# Patient Record
Sex: MALE | Race: WHITE | Employment: FULL TIME | RURAL
[De-identification: names, ages, dates, MRNs, and addresses within clinical notes are randomized per-mention and may not be internally consistent; named-entity substitution may affect disease eponyms.]

---

## 2020-03-03 ENCOUNTER — OFFICE VISIT (OUTPATIENT)
Dept: FAMILY MEDICINE CLINIC | Age: 65
End: 2020-03-03

## 2020-03-03 VITALS
TEMPERATURE: 97.6 F | HEART RATE: 106 BPM | RESPIRATION RATE: 16 BRPM | HEIGHT: 67 IN | OXYGEN SATURATION: 99 % | WEIGHT: 173 LBS | SYSTOLIC BLOOD PRESSURE: 131 MMHG | BODY MASS INDEX: 27.15 KG/M2 | DIASTOLIC BLOOD PRESSURE: 76 MMHG

## 2020-03-03 DIAGNOSIS — M79.89 LEG SWELLING: Primary | ICD-10-CM

## 2020-03-03 RX ORDER — AMOXICILLIN AND CLAVULANATE POTASSIUM 875; 125 MG/1; MG/1
1 TABLET, FILM COATED ORAL EVERY 12 HOURS
Qty: 20 TAB | Refills: 0 | Status: SHIPPED | OUTPATIENT
Start: 2020-03-03 | End: 2020-03-13

## 2020-03-03 NOTE — LETTER
NOTIFICATION RETURN TO WORK / SCHOOL 
 
3/3/2020 2:50 PM 
 
Mr. Delvin Collet [address] [facility] [address] To Whom It May Concern: 
 
Delvin Collet is currently under the care of Charles Montejo. Please excuse him from any days he missed from 3/1/20-3/7/20. He will return to work/school on: 3/8/20 If there are questions or concerns please have the patient contact our office. Sincerely, Kerrin Bence, MD

## 2020-03-03 NOTE — PATIENT INSTRUCTIONS
March 3, 2020 Coryaleida Mariscalyovana [email or web address] [facility] [address] Dear Carlo Lockwood: Thank you for requesting access to [employer]. Please follow the instructions below to view your test results, access and download parts of your medical record, view details of your past and upcoming appointments, and view your medications online. How Do I Sign Up? 1. In your internet browser, go to [email or web address] 2. Click on the First Time User? Click Here link in the Sign In box. You will see the New Member Sign Up page. 3. Enter your "[employer]t Access Code exactly as it appears below. You will not need to use this code after youve completed the sign-up process. If you do not sign up before the expiration date, you must request a new code. · [employer] Access Code: [facility] · Expires: 4/17/2020  7:47 AM 
 
4. Enter the last four digits of your Social Security Number (xxxx) and Date of Birth (mm/dd/yyyy) as indicated and click Submit. You will be taken to the next sign-up page. 5. Create a [employer] ID. This will be your [employer] login ID and cannot be changed, so think of one that is secure and easy to remember. 6. Create a [employer] password. You can change your password at any time. 7. Enter your Password Reset Question and Answer. This can be used at a later time if you forget your password. 8. Enter your e-mail address. You will receive e-mail notification when new information is available in [address] 9. Click Sign Up. You can now view and download portions of your medical record. 10. Click the Download Summary menu link to download a portable copy of your medical information. Additional Information: If you require any assistance or have any questions, please contact our [address] at [phone], email at [email or web address] or check online in our Frequently Asked Questions. Remember, MyChart is NOT to be used for urgent needs. For medical emergencies, dial 911. Now available from your iPhone and Android! Sincerely, Lori Mixon  
 
 
new to my practice Cellulitis: Care Instructions Your Care Instructions Cellulitis is a skin infection caused by bacteria, most often strep or staph. It often occurs after a break in the skin from a scrape, cut, bite, or puncture, or after a rash. Cellulitis may be treated without doing tests to find out what caused it. But your doctor may do tests, if needed, to look for a specific bacteria, like methicillin-resistant Staphylococcus aureus (MRSA). The doctor has checked you carefully, but problems can develop later. If you notice any problems or new symptoms, get medical treatment right away. Follow-up care is a key part of your treatment and safety. Be sure to make and go to all appointments, and call your doctor if you are having problems. It's also a good idea to know your test results and keep a list of the medicines you take. How can you care for yourself at home? · Take your antibiotics as directed. Do not stop taking them just because you feel better. You need to take the full course of antibiotics. · Prop up the infected area on pillows to reduce pain and swelling. Try to keep the area above the level of your heart as often as you can. · If your doctor told you how to care for your wound, follow your doctor's instructions. If you did not get instructions, follow this general advice: 
? Wash the wound with clean water 2 times a day. Don't use hydrogen peroxide or alcohol, which can slow healing. ? You may cover the wound with a thin layer of petroleum jelly, such as Vaseline, and a nonstick bandage. ? Apply more petroleum jelly and replace the bandage as needed. · Be safe with medicines. Take pain medicines exactly as directed. ? If the doctor gave you a prescription medicine for pain, take it as prescribed. ? If you are not taking a prescription pain medicine, ask your doctor if you can take an over-the-counter medicine. To prevent cellulitis in the future · Try to prevent cuts, scrapes, or other injuries to your skin. Cellulitis most often occurs where there is a break in the skin. · If you get a scrape, cut, mild burn, or bite, wash the wound with clean water as soon as you can to help avoid infection. Don't use hydrogen peroxide or alcohol, which can slow healing. · If you have swelling in your legs (edema), support stockings and good skin care may help prevent leg sores and cellulitis. · Take care of your feet, especially if you have diabetes or other conditions that increase the risk of infection. Wear shoes and socks. Do not go barefoot. If you have athlete's foot or other skin problems on your feet, talk to your doctor about how to treat them. When should you call for help? Call your doctor now or seek immediate medical care if: 
  · You have signs that your infection is getting worse, such as: 
? Increased pain, swelling, warmth, or redness. ? Red streaks leading from the area. ? Pus draining from the area. ? A fever.  
  · You get a rash.  
 Watch closely for changes in your health, and be sure to contact your doctor if: 
  · You do not get better as expected. Where can you learn more? Go to http://candelaria-arjun.info/. Hilton Valdez in the search box to learn more about \"Cellulitis: Care Instructions. \" Current as of: April 1, 2019 Content Version: 12.2 © 4057-6010 [employer]. Care instructions adapted under license by [employer] (which disclaims liability or warranty for this information). If you have questions about a medical condition or this instruction, always ask your healthcare professional. [address] any warranty or liability for your use of this information.

## 2020-03-03 NOTE — PROGRESS NOTES
[facility]    Subjective:   Marcio Arias is a 60 y.o. male with no PMHx  CC: foot pain  History provided by patient     HPI:  Pt complains of R foot pain and swelling that started 4 days ago. It is located everywhere in his foot, but hurts most on the dorsal aspect. He feels like it is more swollen than the left side. He has been taking naproxen around the clock, and he rates it as 8/10 in severity. He had a similar episode 2 months ago that resolved on its own. He denies fevers, chills. He has not been sick recently. He has pain when he walks on it. SHx:  T: stopped when he was 22    A: none    D: none    Surg: tumor in nose 1971, dental procedures    FHx: obesity      PFSH:     No current outpatient medications on file prior to visit. No current facility-administered medications on file prior to visit. There is no problem list on file for this patient.       Social History     Socioeconomic History    Marital status: SINGLE     Spouse name: Not on file    Number of children: Not on file    Years of education: Not on file    Highest education level: Not on file   Occupational History    Not on file   Social Needs    Financial resource strain: Not on file    Food insecurity:     Worry: Not on file     Inability: Not on file    Transportation needs:     Medical: Not on file     Non-medical: Not on file   Tobacco Use    Smoking status: Never Smoker    Smokeless tobacco: Never Used   Substance and Sexual Activity    Alcohol use: Never     Frequency: Never    Drug use: Not on file    Sexual activity: Not on file   Lifestyle    Physical activity:     Days per week: Not on file     Minutes per session: Not on file    Stress: Not on file   Relationships    Social connections:     Talks on phone: Not on file     Gets together: Not on file     Attends Shinto service: Not on file     Active member of club or organization: Not on file     Attends meetings of clubs or organizations: Not on file     Relationship status: Not on file    Intimate partner violence:     Fear of current or ex partner: Not on file     Emotionally abused: Not on file     Physically abused: Not on file     Forced sexual activity: Not on file   Other Topics Concern    Not on file   Social History Narrative    Not on file       Review of Systems   Constitutional: Negative for chills and fever. Respiratory: Negative for cough, hemoptysis, sputum production and shortness of breath. Cardiovascular: Positive for leg swelling. Negative for chest pain and palpitations. Musculoskeletal: Positive for joint pain. Negative for back pain and falls. Skin: Positive for rash. Negative for itching. Erythema of forefoot. Objective:     Visit Vitals  /76 (BP 1 Location: Left arm, BP Patient Position: Sitting)   Pulse (!) 106   Temp 97.6 °F (36.4 °C) (Oral)   Resp 16   Ht 5' 7\" (1.702 m)   Wt 173 lb (78.5 kg)   SpO2 99%   BMI 27.10 kg/m²          Physical Exam  Constitutional:       General: He is not in acute distress. Appearance: Normal appearance. HENT:      Head: Normocephalic and atraumatic. Cardiovascular:      Rate and Rhythm: Normal rate and regular rhythm. Pulses: Normal pulses. Heart sounds: No murmur. Pulmonary:      Effort: Pulmonary effort is normal. No respiratory distress. Breath sounds: Normal breath sounds. No wheezing, rhonchi or rales. Musculoskeletal:         General: Swelling and tenderness present. Right lower leg: Edema present. Comments: Tenderness to palpation of right forefoot with 2+ pitting edema extending to right knee. No tenderness with light touch. Left side normal.    Skin:     General: Skin is warm. Findings: Erythema present. Comments: Erythema and warmth extending along dorsal aspect of mid forefoot. Cracked heels and thickened, yellow toenails noted bilaterally. Neurological:      Mental Status: He is alert. Motor: No weakness. Pertinent Labs/Studies: will order stat doppler      Assessment and orders:       ICD-10-CM ICD-9-CM    1. Leg swelling M79.89 729.81 DUPLEX LOWER EXT VENOUS RIGHT     Encounter Diagnoses   Name Primary?  Leg swelling Yes     Diagnoses and all orders for this visit:    1. Leg swelling - pt has been experiencing some swelling and erythema of right foot and lower leg. He has 2+ pitting edema on this side, but the right leg has no edema. Will need to rule out DVT urgently in addition to treating for cellulitis. Foot exhibits erythema and warmth in addition to the swelling. Pt has poor foot hygiene with extensively cracked soles. May benefit from visit with podiatry once acute infection has resolved. -     DUPLEX LOWER EXT VENOUS RIGHT; Future - pt has appointment tomorrow to have duplex  -     amoxicillin-clavulanate (AUGMENTIN) 875-125 mg per tablet; Take 1 Tab by mouth every twelve (12) hours for 10 days. Follow-up and Dispositions    · Return in about 1 week (around 3/10/2020). I have discussed the diagnosis with the patient and the intended plan as seen in the above orders. Social history, medical history, and labs were reviewed. The patient has received an after-visit summary and questions were answered concerning future plans. I have discussed medication side effects and warnings with the patient as well.     Amada Craig MD  Resident [facility]  03/05/20

## 2020-03-03 NOTE — PROGRESS NOTES
Body mass index is 27.1 kg/m².    Health Maintenance Due   Topic Date Due    Hepatitis C Screening  1955    DTaP/Tdap/Td series (1 - Tdap) 06/25/1968    Lipid Screen  06/25/1997    Shingrix Vaccine Age 50> (1 of 2) 06/25/2007    FOBT Q1Y Age 54-65  06/25/2007    Influenza Age 5 to Adult  08/01/2019

## 2020-03-04 ENCOUNTER — TELEPHONE (OUTPATIENT)
Dept: FAMILY MEDICINE CLINIC | Age: 65
End: 2020-03-04

## 2020-03-04 NOTE — TELEPHONE ENCOUNTER
Call transferred to this nurse. Patient currently at the hospital awaiting permission to leave the hospital based on results of duplex lower venous. Per radiology, duplex was negative for a DVT. Verbal given that patient can leave if he is negative for a DVT.

## 2020-03-05 NOTE — TELEPHONE ENCOUNTER
Attempted to call pt to inform him that he did not have blood clots, but his given phone number states \"the person you are trying to call is not accepting calls at this time. \"    Solomon Fox MD  Family Medicine Resident

## 2020-03-19 ENCOUNTER — TELEPHONE (OUTPATIENT)
Dept: FAMILY MEDICINE CLINIC | Age: 65
End: 2020-03-19

## 2020-03-19 NOTE — TELEPHONE ENCOUNTER
----- Message from Kwasi Krishna sent at 3/19/2020  2:45 PM EDT -----  Regarding: Dr. Vivien Mora telephone  Contact: [phone]  Caller's first and last name and relationship (if not the patient): n/a  Best contact number(s): [phone]  Whose call is being returned: unknown   Details to clarify the request: Pt was unable to answer his phone due to technical difficulties. Please leave a detailed voice message if unable to contact pt. Pt states texting would work as well.

## 2020-03-19 NOTE — TELEPHONE ENCOUNTER
Pt states that the erythema and swelling in his foot and calf are continuing to improve. He needs FMLA form filled out because he missed 5 days of work secondary to pain related to his cellulitis. His job told him that since he took that many days off, he needed to have this form filled out by a physician. Indicated that we could do this for him. He will be dropping off the paperwork.     Chucky Gardner MD  Family Medicine Resident

## 2020-03-19 NOTE — TELEPHONE ENCOUNTER
Per Dr. Aaron Dick will have his appointment with you by phone today @ 2:20 regarding having his FMLA Papers filled out. Pt will be available for your call today at the appointment time.  Thanks

## 2020-03-23 NOTE — TELEPHONE ENCOUNTER
Attempted to call patient. No answer and no way to leave a message. We can not text patient and he does not have active Mychart. The forms were faxed on 3/19/20 and a copy is at the  ready for .

## 2020-03-23 NOTE — TELEPHONE ENCOUNTER
Pt states that he was checking for the status of the paperwork being faxed off. They have to have the paperwork by the due date please. Please text or e-mail the Pt if at all possible because he cannot have the phone on him at work. He also needs a copy of what is sent to [facility].  Thanks

## 2020-04-01 ENCOUNTER — OFFICE VISIT (OUTPATIENT)
Dept: FAMILY MEDICINE CLINIC | Age: 65
End: 2020-04-01

## 2020-04-01 VITALS
OXYGEN SATURATION: 95 % | HEIGHT: 67 IN | BODY MASS INDEX: 27.78 KG/M2 | TEMPERATURE: 98.3 F | SYSTOLIC BLOOD PRESSURE: 117 MMHG | DIASTOLIC BLOOD PRESSURE: 71 MMHG | RESPIRATION RATE: 16 BRPM | WEIGHT: 177 LBS | HEART RATE: 78 BPM

## 2020-04-01 DIAGNOSIS — M79.671 RIGHT FOOT PAIN: ICD-10-CM

## 2020-04-01 DIAGNOSIS — L53.9 ERYTHEMA OF FOOT: Primary | ICD-10-CM

## 2020-04-01 DIAGNOSIS — M79.89 LEG SWELLING: ICD-10-CM

## 2020-04-01 RX ORDER — CLINDAMYCIN HYDROCHLORIDE 300 MG/1
300 CAPSULE ORAL 3 TIMES DAILY
Qty: 30 CAP | Refills: 0 | Status: SHIPPED | OUTPATIENT
Start: 2020-04-01 | End: 2020-04-11

## 2020-04-01 NOTE — PROGRESS NOTES
1. Have you been to the ER, urgent care clinic since your last visit? Hospitalized since your last visit? No    2. Have you seen or consulted any other health care providers outside of the [address] [place] [address] since your last visit? Include any pap smears or colon screening.  No  Reviewed record in preparation for visit and have necessary documentation  Pt did not bring medication to office visit for review  opportunity was given for questions  Goals that were addressed and/or need to be completed during or after this appointment include    Health Maintenance Due   Topic Date Due    Hepatitis C Screening  1955    DTaP/Tdap/Td series (1 - Tdap) 06/25/1978    Lipid Screen  06/25/1997    Shingrix Vaccine Age 50> (1 of 2) 06/25/2007    FOBT Q1Y Age 50-75  06/25/2007

## 2020-04-01 NOTE — PROGRESS NOTES
Patient: Cayetano Hair MRN: [id number]  SSN: [id number]    YOB: 1955  Age: 60 y.o. Sex: male        Subjective:     Chief Complaint   Patient presents with    Leg Swelling     right leg       HPI: he is a 60y.o. year old male new to me who presents for recurrence of RLE edema, erythema and pain. Patient reports an episode in January that mostly resolved sans treatment. He was seen in office 4 weeks ago for same. Venous doppler negative for DVT. He was started on antibiotic and reports improvement sans resolution. Patient denies F/C, HA, dizziness, SOB, CP, abdominal pain, dysuria, weakness or paresthesia. He works at [employer]. Patient sans other complaints or concerns at this time. Encounter Diagnoses   Name Primary?  Erythema of foot Yes    Right foot pain     Leg swelling        BP Readings from Last 3 Encounters:   04/01/20 117/71   03/03/20 131/76       Wt Readings from Last 3 Encounters:   04/01/20 177 lb (80.3 kg)   03/03/20 173 lb (78.5 kg)     Body mass index is 27.72 kg/m². Current and past medical information:    Current Medications after this visit[de-identified]     No current outpatient medications on file. No current facility-administered medications for this visit. There are no active problems to display for this patient. No past medical history on file.     No Known Allergies    Past Surgical History:   Procedure Laterality Date    HX HEENT         Social History     Socioeconomic History    Marital status: SINGLE     Spouse name: Not on file    Number of children: Not on file    Years of education: Not on file    Highest education level: Not on file   Tobacco Use    Smoking status: Never Smoker    Smokeless tobacco: Never Used   Substance and Sexual Activity    Alcohol use: Never     Frequency: Never         Objective:     Review of Systems:  Constitutional: Negative for fever or malaise  Derm: see HPI  HEENT: Negative for acute hearing or vision changes  Cardiovascular: Negative for dizziness, chest pain or palpitations  Respiratory: Negative for cough, wheezing or SOB  Musculoskeletal: see HPI  Neurological: Negative for headache, weakness or paresthesia       Vitals:    04/01/20 1123   BP: 117/71   Pulse: 78   Resp: 16   Temp: 98.3 °F (36.8 °C)   TempSrc: Oral   SpO2: 95%   Weight: 177 lb (80.3 kg)   Height: 5' 7\" (1.702 m)      Body mass index is 27.72 kg/m². Physical Exam:  Constitutional: well developed, well nourished, in no acute distress  Skin: edema and erythema right lower leg, warm to touch  Head: normocephalic, atraumatic  Eyes: sclera clear, EOMI, PERRL  Neck: normal range of motion  Cardiovascular:  regular rate and rhythm, 2+ pitting edema RLE edema  Respiratory: clear symmetrical effort  Extremities: RLE with full range of motion, antalgic gait  Neurology: normal strength and sensation  Psych: active, alert and oriented, affect appropriate     Xray: viewed by myself, no bone anomaly appreciated    Assessment and orders:       ICD-10-CM ICD-9-CM    1. Erythema of foot L53.9 695.9 XR FOOT RT MIN 3 V   2. Right foot pain M79.671 729.5 XR FOOT RT MIN 3 V   3. Leg swelling M79.89 729.81 XR FOOT RT MIN 3 V         Plan of care:  Diagnoses were discussed in detail with patient. Will cover with antibiotic. Will schedule phone follow up in 2 days. May need JULIAN, lab work if no improvement. Medication risks/benefits/side effects discussed with patient. All of the patient's questions were addressed and answered to apparent satisfaction. The patient understands and agrees with our plan of care. The patient knows to call back if they have questions about the plan of care or if symptoms change. The patient received an After-Visit Summary which contains VS, diagnoses, orders, allergy and medication lists. No care team member to display        No future appointments.     Signed By: Lexus Hui MD     April 1, 2020

## 2020-04-02 ENCOUNTER — TELEPHONE (OUTPATIENT)
Dept: FAMILY MEDICINE CLINIC | Age: 65
End: 2020-04-02

## 2020-04-02 NOTE — TELEPHONE ENCOUNTER
----- Message from Reanna Urban sent at 4/2/2020 11:10 AM EDT -----  Regarding: DR OSMAN / TELEPHONE  Patient return call        In regard to treatment and an update on symptoms.        Best contact number(s): [phone]                Reanna Urban

## 2020-04-02 NOTE — TELEPHONE ENCOUNTER
Attempted to contact patient earlier this morning as requested by physician. No answer.  Attempted to return call at this time, Phone is not accepting calls at this time    Dr. Deedee Phelps would like patient to have virtual visit scheduled for tomorrow Friday 4/3/2020

## 2020-04-03 ENCOUNTER — VIRTUAL VISIT (OUTPATIENT)
Dept: FAMILY MEDICINE CLINIC | Age: 65
End: 2020-04-03

## 2020-04-03 DIAGNOSIS — M79.671 RIGHT FOOT PAIN: ICD-10-CM

## 2020-04-03 DIAGNOSIS — L53.9 ERYTHEMA OF FOOT: Primary | ICD-10-CM

## 2020-04-03 DIAGNOSIS — M79.89 LEG SWELLING: ICD-10-CM

## 2020-04-03 NOTE — PROGRESS NOTES
Isamar Anders is a 60 y.o. male evaluated via computer/telephone on 20. Patient Identity confirmed by . Telephone encounter done in lieu of office visit due to extraordinary circumstances. A state of national and state emergency has been declared by President and [employer] to the [employer] pandemic. Aaron clarke LPN coordinated virtual visit    Consent:  Patient and/or health care decision maker is aware that that he may receive a bill for this telephone encounter, depending on his insurance coverage, and has provided verbal consent to proceed: Yes    Physician Location: Office  Patient Location: Home    CC: RLE erythema and edema  Information gathered from patient and/or health care decision maker. HPI: Isamar Anders is a 60 y.o. male with recurrent episodes of RLE edema, erythema and pain. Had cited improvement with prior course of antibiotic. Prior doppler negative for DVT. Today he says there has been some improvement in redness and swelling. Says leg is still painful. Discussed adding steroid. Patient wants to wait a view more days to see if it gets better. He knows to call next week if that is the case. We will schedule VV on 20. Current Outpatient Medications:     clindamycin (CLEOCIN) 300 mg capsule, Take 1 Cap by mouth three (3) times daily for 10 days. , Disp: 30 Cap, Rfl: 0     No Known Allergies     There are no active problems to display for this patient. Review of Systems:  Constitutional: Negative for fatigue, malaise  Resp: Negative for cough, wheezing or SOB  CV: see HPI, Negative for chest pain, dizziness or palpitations  GI: Negative for nausea or abdominal pain  MS: see HPI, Negative for acute myalgias or arthralgias   Neuro: Negative for HA, weakness or paresthesia      Assessment/Plan:  Details of this discussion including any medical advice provided:       ICD-10-CM ICD-9-CM    1. Erythema of foot L53.9 695.9    2.  Leg swelling M79.89 729.81    3. Right foot pain M79.671 729.5        Symptomatic therapy suggested: call prn if symptoms persist or worsen. Total Time: minutes: 11-20 minutes was spent on phone discussing above problems and plan. Patient medical history, prior OV notes, vitals flow sheet, lab results, medications, and allergies were reviewed during this encounter. For phone encounters:  I affirm this is a patient initiated episode with an established Patient who has not had a related appointment within my department in the past 7 days or scheduled within the next 24 hours.     Note: not billable if this call serves to triage the patient into an appointment for the relevant concern    MD [facility]  04/03/20

## 2020-04-14 ENCOUNTER — VIRTUAL VISIT (OUTPATIENT)
Dept: FAMILY MEDICINE CLINIC | Age: 65
End: 2020-04-14

## 2020-04-14 DIAGNOSIS — M79.89 LEG SWELLING: Primary | ICD-10-CM

## 2020-04-14 RX ORDER — FUROSEMIDE 20 MG/1
20 TABLET ORAL
Qty: 30 TAB | Refills: 0 | Status: SHIPPED | OUTPATIENT
Start: 2020-04-14 | End: 2020-05-15

## 2020-04-14 NOTE — PROGRESS NOTES
Gwendolyn Suazo is a 60 y.o. male evaluated via telephone on 20. Patient Identity confirmed by . Telephone encounter done in lieu of office visit due to extraordinary circumstances. A state of national and state emergency has been declared by President and [employer] to the [employer] pandemic. Rachael Ariza LPN coordinated virtual visit    Consent:  Patient and/or health care decision maker is aware that that he may receive a bill for this telephone encounter, depending on his insurance coverage, and has provided verbal consent to proceed: Yes    Physician Location: Office  Patient Location: Home    CC: right leg swelling  Information gathered from patient. HPI: Gwendolyn Suazo is a 60 y.o. male with recent hx of recurrent RLE edema, erythema and pain. He has completed second course of antibiotic sans significant improvement of edema. Says he experiences pain when he pivots on his ankle. He has had a venous doppler negative for DVT. Have previously discussed diuretic with patient. He is willing to try medication for edema. Patient advised to purchase scale to check weight as means of monitoring the efficacy of diuretic. Patient denies fever, dizziness, SOB, CP weakness or paresthesia. Current Outpatient Medications:     furosemide (LASIX) 20 mg tablet, Take 1 Tab by mouth every morning., Disp: 30 Tab, Rfl: 0     No Known Allergies     There are no active problems to display for this patient. Review of Systems:  Constitutional: Negative for fever, malaise  Resp: Negative for cough, wheezing or SOB  CV: Negative for chest pain, dizziness or palpitations  Neuro: Negative for HA, weakness or paresthesia      Assessment/Plan:  Details of this discussion including any medical advice provided: Patient advised as a precaution to self isolate at home, practice regular hand washing with soap and warm water and to utilize social distancing in interactions with people. ICD-10-CM ICD-9-CM    1. Leg swelling M79.89 729.81 furosemide (LASIX) 20 mg tablet       Symptomatic therapy suggested: stay well hydrated with water while taking furosemide    Total Time: minutes: 11-20 minutes was spent on phone discussing above problems and plan. Patient medical history, prior OV notes, vitals flow sheet, lab results, medications, and allergies were reviewed during this encounter. For phone encounters:  I affirm this is a patient initiated episode with an established Patient who has not had a related appointment within my department in the past 7 days or scheduled within the next 24 hours.     Note: not billable if this call serves to triage the patient into an appointment for the relevant concern    MD [facility]  04/14/20

## 2020-04-21 ENCOUNTER — VIRTUAL VISIT (OUTPATIENT)
Dept: FAMILY MEDICINE CLINIC | Age: 65
End: 2020-04-21

## 2020-04-21 DIAGNOSIS — R60.0 PEDAL EDEMA: Primary | ICD-10-CM

## 2020-04-21 NOTE — PROGRESS NOTES
Glenroy Balderas is a 60 y.o. male evaluated via telephone on 20. Patient Identity confirmed by . Telephone encounter done in lieu of office visit due to extraordinary circumstances. A state of national and state emergency has been declared by the President and the [place] due to the [employer] pandemic. Pursuant to the emergency declaration under the [facility]5 waiver authority and the [employer] and [employer] Act, this Virtual  Visit was conducted, with patient's consent, to reduce the patient's risk of exposure to COVID-19 and provide continuity of care for an established patient. Ambrose Feldman LPN coordinated virtual visit    Consent:  Patient and/or health care decision maker is aware that that he may receive a bill for this telephone encounter, depending on his insurance coverage, and has provided verbal consent to proceed: Yes    Physician Location: Office  Patient Location: Home    CC: LE edema  Information gathered from patient and/or health care decision maker. HPI: Glenroy Balderas is a 60 y.o. male with hx of recurrent RLE edema, erythema and pain. He has had a venous doppler negative for DVT. He has started 20 mg furosemide and purchased a scale. He reports visibly decreased edema, erythema resolved and a 5 lb weight loss since start of furosemide. He reports urinating more frequently after taking medication with no other side effects. Patient denies fever, HA, dizziness, SOB, CP weakness or paresthesia. Current Outpatient Medications:     furosemide (LASIX) 20 mg tablet, Take 1 Tab by mouth every morning., Disp: 30 Tab, Rfl: 0     No Known Allergies     There are no active problems to display for this patient.        Review of Systems:  Constitutional: Negative for fatigue, malaise  Resp: Negative for cough, wheezing or SOB  CV: Negative for chest pain, dizziness or palpitations  GI: Negative for nausea or abdominal pain  MS: Negative for acute myalgias or arthralgias   Neuro: Negative for HA, weakness or paresthesia  Psych: Negative for depression or anxiety       Assessment/Plan:  Details of this discussion including any medical advice provided: Patient advised as a precaution to self isolate at home when not at work, to practice regular hand washing with soap and warm water and to utilize social distancing in interactions with people outside the home. ICD-10-CM ICD-9-CM    1. Pedal edema R60.0 782.3      Continue current furosemide as written. No medication changes at this time. Total Time: minutes: 11-20 minutes was spent on phone discussing above problems and plan. Patient medical history, prior OV notes, vitals flow sheet, lab results, medications, and allergies were reviewed during this encounter. For phone encounters:  I affirm this is a patient initiated episode with an established Patient who has not had a related appointment within my department in the past 7 days or scheduled within the next 24 hours.     Note: not billable if this call serves to triage the patient into an appointment for the relevant concern    Charmayne Farrier, MD [facility]  04/21/20

## 2020-06-02 ENCOUNTER — OFFICE VISIT (OUTPATIENT)
Dept: FAMILY MEDICINE CLINIC | Age: 65
End: 2020-06-02

## 2020-06-02 ENCOUNTER — HOSPITAL ENCOUNTER (OUTPATIENT)
Dept: LAB | Age: 65
Discharge: HOME | End: 2020-06-02

## 2020-06-02 VITALS
RESPIRATION RATE: 18 BRPM | SYSTOLIC BLOOD PRESSURE: 110 MMHG | HEART RATE: 70 BPM | TEMPERATURE: 98 F | HEIGHT: 67 IN | DIASTOLIC BLOOD PRESSURE: 69 MMHG | OXYGEN SATURATION: 97 % | WEIGHT: 177 LBS | BODY MASS INDEX: 27.78 KG/M2

## 2020-06-02 DIAGNOSIS — R60.0 PEDAL EDEMA: Primary | ICD-10-CM

## 2020-06-02 DIAGNOSIS — R60.0 PEDAL EDEMA: ICD-10-CM

## 2020-06-02 LAB
ALBUMIN SERPL-MCNC: 4 G/DL (ref 3.5–5)
ALBUMIN/GLOB SERPL: 1.3 {RATIO} (ref 1.1–2.2)
ALP SERPL-CCNC: 93 U/L (ref 45–117)
ALT SERPL-CCNC: 37 U/L (ref 12–78)
ANION GAP SERPL CALC-SCNC: 4 MMOL/L (ref 5–15)
AST SERPL-CCNC: 20 U/L (ref 15–37)
BILIRUB SERPL-MCNC: 0.4 MG/DL (ref 0.2–1)
BUN SERPL-MCNC: 18 MG/DL (ref 6–20)
BUN/CREAT SERPL: 19 (ref 12–20)
CALCIUM SERPL-MCNC: 9.5 MG/DL (ref 8.5–10.1)
CHLORIDE SERPL-SCNC: 106 MMOL/L (ref 97–108)
CHOLEST SERPL-MCNC: 139 MG/DL
CO2 SERPL-SCNC: 28 MMOL/L (ref 21–32)
CREAT SERPL-MCNC: 0.96 MG/DL (ref 0.7–1.3)
GLOBULIN SER CALC-MCNC: 3.1 G/DL (ref 2–4)
GLUCOSE SERPL-MCNC: 92 MG/DL (ref 65–100)
HDLC SERPL-MCNC: 38 MG/DL
HDLC SERPL: 3.7 {RATIO} (ref 0–5)
LDLC SERPL CALC-MCNC: 75.4 MG/DL (ref 0–100)
LIPID PROFILE,FLP: NORMAL
POTASSIUM SERPL-SCNC: 5 MMOL/L (ref 3.5–5.1)
PROT SERPL-MCNC: 7.1 G/DL (ref 6.4–8.2)
SODIUM SERPL-SCNC: 138 MMOL/L (ref 136–145)
TRIGL SERPL-MCNC: 128 MG/DL (ref ?–150)
TSH SERPL DL<=0.05 MIU/L-ACNC: 3.18 UIU/ML (ref 0.36–3.74)
URATE SERPL-MCNC: 3.6 MG/DL (ref 3.5–7.2)
VLDLC SERPL CALC-MCNC: 25.6 MG/DL

## 2020-06-02 RX ORDER — FUROSEMIDE 20 MG/1
40 TABLET ORAL DAILY
Qty: 60 TAB | Refills: 1 | Status: SHIPPED | OUTPATIENT
Start: 2020-06-02 | End: 2020-08-06

## 2020-06-02 NOTE — PROGRESS NOTES
1. Have you been to the ER, urgent care clinic since your last visit? Hospitalized since your last visit? No    2. Have you seen or consulted any other health care providers outside of the [address] [place] [address] since your last visit? Include any pap smears or colon screening. No    Reviewed record in preparation for visit and have necessary documentation  Goals that were addressed and/or need to be completed during or after this appointment include     Health Maintenance Due   Topic Date Due    Hepatitis C Screening  1955    DTaP/Tdap/Td series (1 - Tdap) 06/25/1978    Lipid Screen  06/25/1997    Shingrix Vaccine Age 50> (1 of 2) 06/25/2007    FOBT Q1Y Age 50-75  06/25/2007       Patient is accompanied by self I have received verbal consent from Qi Yang to discuss any/all medical information while they are present in the room.

## 2020-06-05 NOTE — PROGRESS NOTES
Patient: Lauri Campos MRN: [id number]  SSN: [id number]    YOB: 1955  Age: 60 y.o. Sex: male        Subjective:     Chief Complaint   Patient presents with    Follow-up    Leg Swelling       HPI: he is a 60y.o. year old male who presents for follow up of RLE edema and erythema. He has had venous doppler which was negative for  DVT. Patient denies F/C, HA, dizziness, SOB, CP, abdominal pain, dysuria, weakness or paresthesia. He works at [employer] Patient sans other complaints or concerns at this time. Encounter Diagnoses   Name Primary?  Pedal edema Yes       BP Readings from Last 3 Encounters:   06/02/20 110/69   04/01/20 117/71   03/03/20 131/76       Wt Readings from Last 3 Encounters:   06/02/20 177 lb (80.3 kg)   04/01/20 177 lb (80.3 kg)   03/03/20 173 lb (78.5 kg)     Body mass index is 27.72 kg/m². Lab Results   Component Value Date/Time    Cholesterol, total 139 06/02/2020 11:17 AM    HDL Cholesterol 38 06/02/2020 11:17 AM    LDL, calculated 75.4 06/02/2020 11:17 AM    Triglyceride 128 06/02/2020 11:17 AM    CHOL/HDL Ratio 3.7 06/02/2020 11:17 AM     Lab Results   Component Value Date/Time    TSH 3.18 06/02/2020 11:17 AM      Lab Results   Component Value Date/Time    Sodium 138 06/02/2020 11:17 AM    Potassium 5.0 06/02/2020 11:17 AM    Chloride 106 06/02/2020 11:17 AM    CO2 28 06/02/2020 11:17 AM    Anion gap 4 (L) 06/02/2020 11:17 AM    Glucose 92 06/02/2020 11:17 AM    BUN 18 06/02/2020 11:17 AM    Creatinine 0.96 06/02/2020 11:17 AM    BUN/Creatinine ratio 19 06/02/2020 11:17 AM    GFR est AA >60 06/02/2020 11:17 AM    GFR est non-AA >60 06/02/2020 11:17 AM    Calcium 9.5 06/02/2020 11:17 AM    Bilirubin, total 0.4 06/02/2020 11:17 AM    ALT (SGPT) 37 06/02/2020 11:17 AM    Alk.  phosphatase 93 06/02/2020 11:17 AM    Protein, total 7.1 06/02/2020 11:17 AM    Albumin 4.0 06/02/2020 11:17 AM    Globulin 3.1 06/02/2020 11:17 AM    A-G Ratio 1.3 06/02/2020 11:17 AM Current and past medical information:    Current Medications after this visit[de-identified]     Current Outpatient Medications   Medication Sig    Comp Stocking,Knee,Regular,Med misc Use daily for lower leg swelling.  furosemide (LASIX) 20 mg tablet Take 2 Tabs by mouth daily. No current facility-administered medications for this visit. There are no active problems to display for this patient. History reviewed. No pertinent past medical history. No Known Allergies    Past Surgical History:   Procedure Laterality Date    HX HEENT         Social History     Socioeconomic History    Marital status: SINGLE     Spouse name: Not on file    Number of children: Not on file    Years of education: Not on file    Highest education level: Not on file   Tobacco Use    Smoking status: Never Smoker    Smokeless tobacco: Never Used   Substance and Sexual Activity    Alcohol use: Never     Frequency: Never         Objective:     Review of Systems:  Constitutional: Negative for fever or malaise  Derm: see HPI  HEENT: Negative for acute hearing or vision changes  Cardiovascular: Negative for dizziness, chest pain or palpitations  Respiratory: Negative for cough, wheezing or SOB  Musculoskeletal: see HPI  Neurological: Negative for headache, weakness or paresthesia       Vitals:    06/02/20 1021   BP: 110/69   Pulse: 70   Resp: 18   Temp: 98 °F (36.7 °C)   TempSrc: Oral   SpO2: 97%   Weight: 177 lb (80.3 kg)   Height: 5' 7\" (1.702 m)      Body mass index is 27.72 kg/m².     Physical Exam:  Constitutional: well developed, well nourished, in no acute distress  Skin: edema and erythema right lower leg, warm to touch  Head: normocephalic, atraumatic  Eyes: sclera clear, EOMI  Neck: normal range of motion  Cardiovascular:  regular rate and rhythm, 1+ pitting edema RLE edema  Respiratory: clear with symmetrical effort  Extremities: RLE with full range of motion, antalgic gait  Neurology: normal strength and sensation  Psych: active, alert and oriented, affect appropriate       Assessment and orders:       ICD-10-CM ICD-9-CM    1. Pedal edema R60.0 782.3 LIPID PANEL      TSH 3RD GENERATION      METABOLIC PANEL, COMPREHENSIVE      Comp Stocking,Knee,Regular,Med misc      URIC ACID      furosemide (LASIX) 20 mg tablet         Plan of care:  Patient advised as a precaution to self isolate at home, practice regular hand washing with soap and warm water and to wear a mask and utilize social distancing when necessary to be out in public places. Diagnoses were discussed in detail with patient. Medication risks/benefits/side effects discussed with patient. All of the patient's questions were addressed and answered to apparent satisfaction. The patient understands and agrees with our plan of care. The patient knows to call back if they have questions about the plan of care or if symptoms change. The patient received an After-Visit Summary which contains VS, diagnoses, orders, allergy and medication lists. No care team member to display    Follow-up and Dispositions    · Return in about 4 weeks (around 6/30/2020), or if symptoms worsen or fail to improve, for VV. No future appointments.     Signed By: Dalhia Gonzáles MD     June 4, 2020

## 2020-06-09 ENCOUNTER — TELEPHONE (OUTPATIENT)
Dept: FAMILY MEDICINE CLINIC | Age: 65
End: 2020-06-09

## 2020-06-09 NOTE — TELEPHONE ENCOUNTER
Attempted to return call to patient x2. \"not accepting calls at this time\"  Needs to know if patient got compression stockings and taking medication as prescribed.

## 2020-06-09 NOTE — TELEPHONE ENCOUNTER
----- Message from Bayron Naqvi sent at 6/9/2020  9:48 AM EDT -----  Regarding: /Telephone  General Message/Vendor Calls    Caller's first and last name:      Reason for call:  Swelling on rt foot still not better    Callback required yes/no and why:  Yes, to discuss what he can do.      Best contact number(s):  [phone]    Details to clarify the request:      Bayron Naqvi

## 2020-06-11 NOTE — TELEPHONE ENCOUNTER
Attempted to call patient again. No answer \" wireless caller not available. \"  Unable to leave voicemail.

## 2021-06-01 ENCOUNTER — OFFICE VISIT (OUTPATIENT)
Dept: FAMILY MEDICINE CLINIC | Age: 66
End: 2021-06-01
Payer: COMMERCIAL

## 2021-06-01 VITALS
HEART RATE: 60 BPM | WEIGHT: 177.8 LBS | HEIGHT: 67 IN | OXYGEN SATURATION: 97 % | RESPIRATION RATE: 16 BRPM | SYSTOLIC BLOOD PRESSURE: 104 MMHG | BODY MASS INDEX: 27.91 KG/M2 | TEMPERATURE: 98.2 F | DIASTOLIC BLOOD PRESSURE: 63 MMHG

## 2021-06-01 DIAGNOSIS — Z00.00 PHYSICAL EXAM, ANNUAL: Primary | ICD-10-CM

## 2021-06-01 DIAGNOSIS — R60.0 PEDAL EDEMA: ICD-10-CM

## 2021-06-01 PROCEDURE — 99396 PREV VISIT EST AGE 40-64: CPT | Performed by: FAMILY MEDICINE

## 2021-06-01 RX ORDER — FUROSEMIDE 20 MG/1
TABLET ORAL
Qty: 180 TABLET | Refills: 1 | Status: SHIPPED | OUTPATIENT
Start: 2021-06-01 | End: 2021-12-08

## 2021-06-01 NOTE — PROGRESS NOTES
1. Have you been to the ER, urgent care clinic since your last visit? Hospitalized since your last visit? No    2. Have you seen or consulted any other health care providers outside of the [address] [place] [address] since your last visit? Include any pap smears or colon screening.  No  Reviewed record in preparation for visit and have necessary documentation    Goals that were addressed and/or need to be completed during or after this appointment include     Health Maintenance Due   Topic Date Due    Hepatitis C Screening  Never done    COVID-19 Vaccine (1) Never done    DTaP/Tdap/Td series (1 - Tdap) Never done    Shingrix Vaccine Age 50> (1 of 2) Never done    Colorectal Cancer Screening Combo  Never done

## 2021-06-06 NOTE — PROGRESS NOTES
Patient: Ra Richardson MRN: [id number]  SSN: [id number]    YOB: 1955  Age: 63 y.o. Sex: male        Subjective:     Chief Complaint   Patient presents with    Follow Up Chronic Condition       HPI: he is a 63y.o. year old male who presents for CPE. He has a hx of RLE edema. This has been well controlled with diuretic and compression stocking. Patient denies F/C, HA, dizziness, SOB, CP, abdominal pain, dysuria, weakness or paresthesia. He works at [employer]. Patient sans other complaints or concerns at this time. Encounter Diagnoses   Name Primary?  Pedal edema        BP Readings from Last 3 Encounters:   06/01/21 104/63   06/02/20 110/69   04/01/20 117/71       Wt Readings from Last 3 Encounters:   06/01/21 177 lb 12.8 oz (80.6 kg)   06/02/20 177 lb (80.3 kg)   04/01/20 177 lb (80.3 kg)     Body mass index is 27.85 kg/m². Lab Results   Component Value Date/Time    Cholesterol, total 139 06/02/2020 11:17 AM    HDL Cholesterol 38 06/02/2020 11:17 AM    LDL, calculated 75.4 06/02/2020 11:17 AM    Triglyceride 128 06/02/2020 11:17 AM    CHOL/HDL Ratio 3.7 06/02/2020 11:17 AM     Lab Results   Component Value Date/Time    TSH 3.18 06/02/2020 11:17 AM      Lab Results   Component Value Date/Time    Sodium 138 06/02/2020 11:17 AM    Potassium 5.0 06/02/2020 11:17 AM    Chloride 106 06/02/2020 11:17 AM    CO2 28 06/02/2020 11:17 AM    Anion gap 4 (L) 06/02/2020 11:17 AM    Glucose 92 06/02/2020 11:17 AM    BUN 18 06/02/2020 11:17 AM    Creatinine 0.96 06/02/2020 11:17 AM    BUN/Creatinine ratio 19 06/02/2020 11:17 AM    GFR est AA >60 06/02/2020 11:17 AM    GFR est non-AA >60 06/02/2020 11:17 AM    Calcium 9.5 06/02/2020 11:17 AM    Bilirubin, total 0.4 06/02/2020 11:17 AM    ALT (SGPT) 37 06/02/2020 11:17 AM    Alk.  phosphatase 93 06/02/2020 11:17 AM    Protein, total 7.1 06/02/2020 11:17 AM    Albumin 4.0 06/02/2020 11:17 AM    Globulin 3.1 06/02/2020 11:17 AM    A-G Ratio 1.3 06/02/2020 11:17 AM          Current and past medical information:    Current Medications after this visit[de-identified]     Current Outpatient Medications   Medication Sig    furosemide (LASIX) 20 mg tablet Take 2 tablets by mouth once daily    Comp Stocking,Knee,Regular,Med misc Use daily for lower leg swelling. No current facility-administered medications for this visit. There are no problems to display for this patient. History reviewed. No pertinent past medical history. No Known Allergies    Past Surgical History:   Procedure Laterality Date    HX HEENT         Social History     Socioeconomic History    Marital status: SINGLE     Spouse name: Not on file    Number of children: Not on file    Years of education: Not on file    Highest education level: Not on file   Tobacco Use    Smoking status: Never Smoker    Smokeless tobacco: Never Used   Substance and Sexual Activity    Alcohol use: Never     Social Determinants of Health     Financial Resource Strain:     Difficulty of Paying Living Expenses:    Food Insecurity:     Worried About Running Out of Food in the Last Year:     [address] in the Last Year:    Transportation Needs:     Lack of Transportation (Medical):      Lack of Transportation (Non-Medical):    Physical Activity:     Days of Exercise per Week:     Minutes of Exercise per Session:    Stress:     Feeling of Stress :    Social Connections:     Frequency of Communication with Friends and Family:     Frequency of Social Gatherings with Friends and Family:     Attends [employer] Services:     Active Member of Clubs or Organizations:     Attends Club or Organization Meetings:     Marital Status:          Objective:     Review of Systems:  Constitutional: Negative for fatigue or malaise  Endo: Negative for unusual thirst or weight changes  HEENT: Negative for acute hearing or vision changes  Cardiovascular: Negative for dizziness, chest pain or palpitations  Respiratory: Negative for cough, wheezing or SOB  Gastrointestinal: Negative for nausea or abdominal pain  Genital/urinary: Negative for dysuria or voiding dysfunction  Musculoskeletal: Negative for myalgias or arthralgias   Neurological: Negative for headache, weakness or paresthesia  Skin: Negative for rash or lesion  Psychological: Negative for depression or anxiety           Vitals:    06/01/21 1049   BP: 104/63   Pulse: 60   Resp: 16   Temp: 98.2 °F (36.8 °C)   TempSrc: Oral   SpO2: 97%   Weight: 177 lb 12.8 oz (80.6 kg)   Height: 5' 7\" (1.702 m)      Body mass index is 27.85 kg/m². Physical Exam:  Constitutional: well developed, well nourished, in no acute distress  Skin: normal tone and turgor  Head: normocephalic, atraumatic  Eyes: sclera clear, EOMI  Neck: normal range of motion  Cardiovascular:  regular rate and rhythm, trace edema RLE edema  Respiratory: clear with symmetrical effort  Extremities: FROM, antalgic gait  Neurology: normal strength and sensation  Psych: active, alert and oriented, affect appropriate       Assessment and orders:       ICD-10-CM ICD-9-CM    1. Pedal edema  R60.0 782.3 furosemide (LASIX) 20 mg tablet         Plan of care:  Diagnoses were discussed in detail with patient. Medications reviewed and appropriate. Patient to continue current prescribed medications as written. Medication risks/benefits/side effects discussed with patient. All of the patient's questions were addressed and answered to apparent satisfaction. The patient understands and agrees with our plan of care. The patient knows to call back if they have questions about the plan of care or if symptoms change. The patient received an After-Visit Summary which contains VS, diagnoses, orders, allergy and medication lists.       Patient Care Team:  Shawanda Hendrickson MD as PCP - General (Family Medicine)  Shawanda Hendrickson MD as PCP - [facility] Agustín Escobar Provider        Future Appointments   Date Time Provider Eveline Bashir 12/1/2021  1:00 PM Nicole Kumar MD [facility] BS AMB       Signed By: Roberth Rouse MD     June 6, 2021

## 2021-11-12 ENCOUNTER — TELEPHONE (OUTPATIENT)
Dept: FAMILY MEDICINE CLINIC | Age: 66
End: 2021-11-12

## 2021-11-12 NOTE — TELEPHONE ENCOUNTER
----- Message from Juan Antoniojai Boateng sent at 11/12/2021  3:16 PM EST -----  Subject: Appointment Request    Reason for Call: Urgent (Patient Request) Existing Condition Follow    QUESTIONS  Type of Appointment? Established Patient  Reason for appointment request? Available appointments did not meet   patient need  Additional Information for Provider? Spoke with patient, he is needing to   know if his provider can either send in a prescription for his diarrhea   and cough or if he can schedule sooner appointment for him . Please return   call to address. Patient did not want to do a VV appointment Thank you  ---------------------------------------------------------------------------  --------------  CALL BACK INFO  What is the best way for the office to contact you? OK to leave message on   voicemail  Preferred Call Back Phone Number? [phone]  ---------------------------------------------------------------------------  --------------  SCRIPT ANSWERS  Relationship to Patient? Self  (Is the patient requesting to be seen urgently for their symptoms?)? Yes  Is this follow up request related to routine Diabetes Management? No  Are you having any new concerns about your existing condition? Yes  Have you been diagnosed with, awaiting test results for, or told that you   are suspected of having COVID-19 (Coronavirus)? (If patient has tested   negative or was tested as a requirement for work, school, or travel and   not based on symptoms, answer no)? No  Within the past two weeks have you developed any of the following symptoms   (answer no if symptoms have been present longer than 2 weeks or began   more than 2 weeks ago)? Fever or Chills, Cough, Shortness of breath or   difficulty breathing, Loss of taste or smell, Sore throat, Nasal   congestion, Sneezing or runny nose, Fatigue or generalized body aches   (answer no if pain is specific to a body part e.g. back pain), Diarrhea,   Headache?  Yes

## 2021-11-12 NOTE — TELEPHONE ENCOUNTER
Called patient, no answer. Dr Ivan Baig has openings before 12-1-21. Please schedule a sooner Office Visit with Dr Ivan Baig.

## 2021-12-01 ENCOUNTER — OFFICE VISIT (OUTPATIENT)
Dept: FAMILY MEDICINE CLINIC | Age: 66
End: 2021-12-01
Payer: COMMERCIAL

## 2021-12-01 VITALS
WEIGHT: 166.8 LBS | HEART RATE: 109 BPM | DIASTOLIC BLOOD PRESSURE: 74 MMHG | OXYGEN SATURATION: 98 % | HEIGHT: 67 IN | RESPIRATION RATE: 14 BRPM | TEMPERATURE: 98.2 F | BODY MASS INDEX: 26.18 KG/M2 | SYSTOLIC BLOOD PRESSURE: 117 MMHG

## 2021-12-01 DIAGNOSIS — R19.7 DIARRHEA, UNSPECIFIED TYPE: Primary | ICD-10-CM

## 2021-12-01 DIAGNOSIS — R60.0 PEDAL EDEMA: ICD-10-CM

## 2021-12-01 DIAGNOSIS — Z12.11 SCREEN FOR COLON CANCER: ICD-10-CM

## 2021-12-01 DIAGNOSIS — K45.8 OTHER SPECIFIED ABDOMINAL HERNIA WITHOUT OBSTRUCTION OR GANGRENE: ICD-10-CM

## 2021-12-01 PROCEDURE — 99214 OFFICE O/P EST MOD 30 MIN: CPT | Performed by: FAMILY MEDICINE

## 2021-12-01 NOTE — PROGRESS NOTES
1. Have you been to the ER, urgent care clinic since your last visit? Hospitalized since your last visit? No    2. Have you seen or consulted any other health care providers outside of the [address] [place] [address] since your last visit? Include any pap smears or colon screening. No    Reviewed record in preparation for visit and have necessary documentation  Pt did not bring medication to office visit for review  Patient is accompanied by self I have received verbal consent from Yousif Martins to discuss any/all medical information while they are present in the room.     Goals that were addressed and/or need to be completed during or after this appointment include     Health Maintenance Due   Topic Date Due    Hepatitis C Screening  Never done    DTaP/Tdap/Td series (1 - Tdap) Never done    Colorectal Cancer Screening Combo  Never done    Shingrix Vaccine Age 50> (1 of 2) Never done    Flu Vaccine (1) 09/01/2021

## 2021-12-02 LAB
ALBUMIN SERPL-MCNC: 3.4 G/DL (ref 3.5–5)
ALBUMIN/GLOB SERPL: 0.8 {RATIO} (ref 1.1–2.2)
ALP SERPL-CCNC: 88 U/L (ref 45–117)
ALT SERPL-CCNC: 27 U/L (ref 12–78)
ANION GAP SERPL CALC-SCNC: 7 MMOL/L (ref 5–15)
AST SERPL-CCNC: 20 U/L (ref 15–37)
BILIRUB SERPL-MCNC: 0.4 MG/DL (ref 0.2–1)
BUN SERPL-MCNC: 9 MG/DL (ref 6–20)
BUN/CREAT SERPL: 10 (ref 12–20)
CALCIUM SERPL-MCNC: 9 MG/DL (ref 8.5–10.1)
CHLORIDE SERPL-SCNC: 104 MMOL/L (ref 97–108)
CO2 SERPL-SCNC: 27 MMOL/L (ref 21–32)
CREAT SERPL-MCNC: 0.9 MG/DL (ref 0.7–1.3)
ERYTHROCYTE [DISTWIDTH] IN BLOOD BY AUTOMATED COUNT: 13.4 % (ref 11.5–14.5)
GLOBULIN SER CALC-MCNC: 4.1 G/DL (ref 2–4)
GLUCOSE SERPL-MCNC: 87 MG/DL (ref 65–100)
HCT VFR BLD AUTO: 40.5 % (ref 36.6–50.3)
HGB BLD-MCNC: 12.7 G/DL (ref 12.1–17)
MAGNESIUM SERPL-MCNC: 2.3 MG/DL (ref 1.6–2.4)
MCH RBC QN AUTO: 28 PG (ref 26–34)
MCHC RBC AUTO-ENTMCNC: 31.4 G/DL (ref 30–36.5)
MCV RBC AUTO: 89.4 FL (ref 80–99)
NRBC # BLD: 0 K/UL (ref 0–0.01)
NRBC BLD-RTO: 0 PER 100 WBC
PLATELET # BLD AUTO: 427 K/UL (ref 150–400)
PMV BLD AUTO: 10.2 FL (ref 8.9–12.9)
POTASSIUM SERPL-SCNC: 4.5 MMOL/L (ref 3.5–5.1)
PROT SERPL-MCNC: 7.5 G/DL (ref 6.4–8.2)
RBC # BLD AUTO: 4.53 M/UL (ref 4.1–5.7)
SODIUM SERPL-SCNC: 138 MMOL/L (ref 136–145)
TSH SERPL DL<=0.05 MIU/L-ACNC: 1.96 UIU/ML (ref 0.36–3.74)
WBC # BLD AUTO: 9.6 K/UL (ref 4.1–11.1)

## 2021-12-06 NOTE — PROGRESS NOTES
Patient: Jacqueline Campos MRN: [id number]  SSN: [id number]    YOB: 1955  Age: 61 y.o. Sex: male        Subjective:     Chief Complaint   Patient presents with    Follow Up Chronic Condition    Diarrhea     [id number], lump in groin        HPI: he is a 61y.o. year old male who presents for 6 month follow up. He has a hx of RLE edema. This has been well controlled with diuretic and compression stocking. He reports diarrhea for >1 month. He reports daily symptoms. He has never had colonoscopy. Patient denies abdominal pain or blood in diarrhea. Appetite normal. He denies F/C, HA, dizziness, SOB, CP, dysuria, weakness or paresthesia. He does report a RLQ reducible bulge. He works at [employer]. Encounter Diagnoses   Name Primary?  Diarrhea, unspecified type Yes    Other specified abdominal hernia without obstruction or gangrene     Pedal edema        BP Readings from Last 3 Encounters:   12/01/21 117/74   06/01/21 104/63   06/02/20 110/69       Wt Readings from Last 3 Encounters:   12/01/21 166 lb 12.8 oz (75.7 kg)   06/01/21 177 lb 12.8 oz (80.6 kg)   06/02/20 177 lb (80.3 kg)     Body mass index is 26.12 kg/m².     Lab Results   Component Value Date/Time    Cholesterol, total 139 06/02/2020 11:17 AM    HDL Cholesterol 38 06/02/2020 11:17 AM    LDL, calculated 75.4 06/02/2020 11:17 AM    Triglyceride 128 06/02/2020 11:17 AM    CHOL/HDL Ratio 3.7 06/02/2020 11:17 AM     Lab Results   Component Value Date/Time    TSH 1.96 12/01/2021 02:15 PM      Lab Results   Component Value Date/Time    Sodium 138 12/01/2021 02:15 PM    Potassium 4.5 12/01/2021 02:15 PM    Chloride 104 12/01/2021 02:15 PM    CO2 27 12/01/2021 02:15 PM    Anion gap 7 12/01/2021 02:15 PM    Glucose 87 12/01/2021 02:15 PM    BUN 9 12/01/2021 02:15 PM    Creatinine 0.90 12/01/2021 02:15 PM    BUN/Creatinine ratio 10 (L) 12/01/2021 02:15 PM    GFR est AA >60 12/01/2021 02:15 PM    GFR est non-AA >60 12/01/2021 02:15 PM    Calcium 9.0 12/01/2021 02:15 PM    Bilirubin, total 0.4 12/01/2021 02:15 PM    ALT (SGPT) 27 12/01/2021 02:15 PM    Alk. phosphatase 88 12/01/2021 02:15 PM    Protein, total 7.5 12/01/2021 02:15 PM    Albumin 3.4 (L) 12/01/2021 02:15 PM    Globulin 4.1 (H) 12/01/2021 02:15 PM    A-G Ratio 0.8 (L) 12/01/2021 02:15 PM          Current and past medical information:    Current Medications after this visit[de-identified]     Current Outpatient Medications   Medication Sig    furosemide (LASIX) 20 mg tablet Take 2 tablets by mouth once daily    Comp Stocking,Knee,Regular,Med misc Use daily for lower leg swelling. No current facility-administered medications for this visit. There are no problems to display for this patient. History reviewed. No pertinent past medical history. No Known Allergies    Past Surgical History:   Procedure Laterality Date    HX HEENT         Social History     Socioeconomic History    Marital status: SINGLE   Tobacco Use    Smoking status: Never Smoker    Smokeless tobacco: Never Used   Substance and Sexual Activity    Alcohol use: Never         Objective:     Review of Systems:  Constitutional: Negative for fatigue or malaise  Endo: Negative for unusual thirst or weight changes  HEENT: Negative for acute hearing or vision changes  Cardiovascular: Negative for dizziness, chest pain or palpitations  Respiratory: Negative for cough, wheezing or SOB  Gastrointestinal: see HPI  Genital/urinary: Negative for dysuria or voiding dysfunction  Musculoskeletal: Negative for myalgias or arthralgias   Neurological: Negative for headache, weakness or paresthesia  Skin: Negative for rash or lesion  Psychological: Negative for depression or anxiety           Vitals:    12/01/21 1313   BP: 117/74   Pulse: (!) 109   Resp: 14   Temp: 98.2 °F (36.8 °C)   TempSrc: Oral   SpO2: 98%   Weight: 166 lb 12.8 oz (75.7 kg)   Height: 5' 7\" (1.702 m)      Body mass index is 26.12 kg/m².     Physical Exam:  Constitutional: well developed, well nourished, in no acute distress  Skin: normal tone and turgor  Head: normocephalic, atraumatic  Eyes: sclera clear, EOMI  Neck: normal range of motion  Cardiovascular:  regular rate and rhythm, trace edema RLE edema  Respiratory: clear with symmetrical effort  Abdomen: reducible RLQ bulge, BS normal  Extremities: FROM, antalgic gait  Neurology: normal strength and sensation  Psych: active, alert and oriented, affect appropriate       Assessment and orders:       ICD-10-CM ICD-9-CM    1. Diarrhea, unspecified type  R19.7 787.91 XR ABD FLAT/ ERECT      MAGNESIUM      METABOLIC PANEL, COMPREHENSIVE      TSH 3RD GENERATION      CBC W/O DIFF      CBC W/O DIFF      TSH 3RD GENERATION      METABOLIC PANEL, COMPREHENSIVE      MAGNESIUM   2. Other specified abdominal hernia without obstruction or gangrene  K45.8 553.8    3. Pedal edema  T95.6 267.7 METABOLIC PANEL, COMPREHENSIVE      METABOLIC PANEL, COMPREHENSIVE         Plan of care:  Diagnoses were discussed in detail with patient. Medications reviewed and appropriate. Patient to continue current prescribed medications as written. Medication risks/benefits/side effects discussed with patient. All of the patient's questions were addressed and answered to apparent satisfaction. The patient understands and agrees with our plan of care. The patient knows to call back if they have questions about the plan of care or if symptoms change. The patient received an After-Visit Summary which contains VS, diagnoses, orders, allergy and medication lists. Patient Care Team:  Mercedes Garcia MD as PCP - General (Family Medicine)  Mercedes Garcia MD as PCP - [facility] Provider    Follow-up and Dispositions    · Return in about 2 months (around 2/1/2022).          Future Appointments   Date Time Provider Eveline Bashir   2/1/2022  2:00 PM Mercedes Garcia MD [facility] BS AMB       Signed By: Elissa Joel MD     December 5, 2021

## 2021-12-17 ENCOUNTER — TELEPHONE (OUTPATIENT)
Dept: FAMILY MEDICINE CLINIC | Age: 66
End: 2021-12-17

## 2021-12-17 NOTE — TELEPHONE ENCOUNTER
----- Message from Angelo Toscano sent at 12/17/2021  1:02 PM EST -----  Subject: Message to Provider    QUESTIONS  Information for Provider? Pt returned call to discuss referral. Attempted   to get him over. Please return pt call.   ---------------------------------------------------------------------------  --------------  CALL BACK INFO  What is the best way for the office to contact you? OK to leave message on   voicemail  Preferred Call Back Phone Number?  [phone]  ---------------------------------------------------------------------------  --------------  SCRIPT ANSWERS  undefined

## 2021-12-17 NOTE — TELEPHONE ENCOUNTER
Pt made aware that referrals were done and that letters were sent out to him. Pt made aware that if he does not hear from those offices, the numbers were provided to him in the letter and he could give them a call. He verbalized understanding.

## 2022-01-07 ENCOUNTER — TELEPHONE (OUTPATIENT)
Dept: FAMILY MEDICINE CLINIC | Age: 67
End: 2022-01-07

## 2022-01-07 NOTE — TELEPHONE ENCOUNTER
----- Message from Rakesh Lyle sent at 1/7/2022  4:43 PM EST -----  Subject: Message to Provider    QUESTIONS  Information for Provider? Patient wanted to follow up on medical records   information being sent to General Surgery referral. He stated that the   surgeons office had not received medical records as of 1/7. Patient would   like for someone to contact for further information due to schedule appt   on 1/12.  ---------------------------------------------------------------------------  --------------  CALL BACK INFO  What is the best way for the office to contact you? OK to leave message on   voicemail  Preferred Call Back Phone Number? [phone]  ---------------------------------------------------------------------------  --------------  SCRIPT ANSWERS  Relationship to Patient?  Self

## 2022-02-01 ENCOUNTER — OFFICE VISIT (OUTPATIENT)
Dept: FAMILY MEDICINE CLINIC | Age: 67
End: 2022-02-01
Payer: COMMERCIAL

## 2022-02-01 VITALS
OXYGEN SATURATION: 97 % | RESPIRATION RATE: 16 BRPM | SYSTOLIC BLOOD PRESSURE: 108 MMHG | WEIGHT: 149.2 LBS | DIASTOLIC BLOOD PRESSURE: 66 MMHG | HEART RATE: 108 BPM | TEMPERATURE: 98.3 F | BODY MASS INDEX: 23.37 KG/M2

## 2022-02-01 DIAGNOSIS — Z09 HOSPITAL DISCHARGE FOLLOW-UP: ICD-10-CM

## 2022-02-01 DIAGNOSIS — R19.7 DIARRHEA, UNSPECIFIED TYPE: ICD-10-CM

## 2022-02-01 DIAGNOSIS — I82.4Y1 ACUTE DEEP VEIN THROMBOSIS (DVT) OF PROXIMAL VEIN OF RIGHT LOWER EXTREMITY (HCC): ICD-10-CM

## 2022-02-01 DIAGNOSIS — I26.99 ACUTE PULMONARY EMBOLISM, UNSPECIFIED PULMONARY EMBOLISM TYPE, UNSPECIFIED WHETHER ACUTE COR PULMONALE PRESENT (HCC): ICD-10-CM

## 2022-02-01 DIAGNOSIS — U09.9 POST-COVID-19 CONDITION: Primary | ICD-10-CM

## 2022-02-01 PROCEDURE — 99214 OFFICE O/P EST MOD 30 MIN: CPT | Performed by: FAMILY MEDICINE

## 2022-02-01 PROCEDURE — 1111F DSCHRG MED/CURRENT MED MERGE: CPT | Performed by: FAMILY MEDICINE

## 2022-02-01 RX ORDER — LOPERAMIDE HYDROCHLORIDE 2 MG/1
2 CAPSULE ORAL
Qty: 30 CAPSULE | Refills: 2 | Status: SHIPPED | OUTPATIENT
Start: 2022-02-01

## 2022-02-01 RX ORDER — TRAMADOL HYDROCHLORIDE 50 MG/1
TABLET ORAL
COMMUNITY
Start: 2022-01-25 | End: 2022-08-29

## 2022-02-01 RX ORDER — APIXABAN 5 MG/1
TABLET, FILM COATED ORAL
COMMUNITY
Start: 2022-01-25 | End: 2022-02-18 | Stop reason: SDUPTHER

## 2022-02-01 NOTE — PROGRESS NOTES
1. Have you been to the ER, urgent care clinic since your last visit? Hospitalized since your last visit? Yes When: 1/20/22 [address] to 1/21/22 [facility] for blood clot in leg and also had covid     2. Have you seen or consulted any other health care providers outside of the [address] [place] [address] since your last visit? Include any pap smears or colon screening. No    Reviewed record in preparation for visit and have necessary documentation  Pt did not bring medication to office visit for review  Patient is accompanied by self I have received verbal consent from Gretchen Pollock to discuss any/all medical information while they are present in the room.     Goals that were addressed and/or need to be completed during or after this appointment include     Health Maintenance Due   Topic Date Due    Hepatitis C Screening  Never done    DTaP/Tdap/Td series (1 - Tdap) Never done    Colorectal Cancer Screening Combo  Never done    Shingrix Vaccine Age 50> (1 of 2) Never done    Flu Vaccine (1) 09/01/2021    COVID-19 Vaccine (3 - Booster for Moderna series) 10/19/2021

## 2022-02-06 NOTE — PROGRESS NOTES
Patient: Steve Gay MRN: [id number]  SSN: [id number]    YOB: 1955  Age: 61 y.o. Sex: male        Subjective:     Chief Complaint   Patient presents with   [facility] Follow Up       HPI: he is a 61y.o. year old male who presents for follow up of hospitalization for PE and DVT which he developed secondary to Covid-19. First seen at [facility], then transferred to [facility]. He has a hx of RLE edema. This has been well controlled with diuretic and compression stocking. He reports continued diarrhea symptoms. He has never had colonoscopy. He is concerned about this due to anticoagulation. Patient alsowith RLQ reducible bulge. Surgery will hav to be delayed due to anticoagulation. He works at [employer]. He has been out of work for these problems. I advised patient to get [place] paperwork so this can be completed for his job. Encounter Diagnoses   Name Primary? 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     Lab Results   Component Value Date/Time    Cholesterol, total 139 06/02/2020 11:17 AM    HDL Cholesterol 38 06/02/2020 11:17 AM    LDL, calculated 75.4 06/02/2020 11:17 AM    Triglyceride 128 06/02/2020 11:17 AM    CHOL/HDL Ratio 3.7 06/02/2020 11:17 AM     Lab Results   Component Value Date/Time    TSH 1.96 12/01/2021 02:15 PM      Lab Results   Component Value Date/Time    Sodium 138 12/01/2021 02:15 PM    Potassium 4.5 12/01/2021 02:15 PM    Chloride 104 12/01/2021 02:15 PM    CO2 27 12/01/2021 02:15 PM    Anion gap 7 12/01/2021 02:15 PM    Glucose 87 12/01/2021 02:15 PM    BUN 9 12/01/2021 02:15 PM    Creatinine 0.90 12/01/2021 02:15 PM    BUN/Creatinine ratio 10 (L) 12/01/2021 02:15 PM    GFR est AA >60 12/01/2021 02:15 PM    GFR est non-AA >60 12/01/2021 02:15 PM    Calcium 9.0 12/01/2021 02:15 PM    Bilirubin, total 0.4 12/01/2021 02:15 PM    ALT (SGPT) 27 12/01/2021 02:15 PM    Alk. phosphatase 88 12/01/2021 02:15 PM    Protein, total 7.5 12/01/2021 02:15 PM    Albumin 3.4 (L) 12/01/2021 02:15 PM    Globulin 4.1 (H) 12/01/2021 02:15 PM    A-G Ratio 0.8 (L) 12/01/2021 02:15 PM          Current and past medical information:    Current Medications after this visit[de-identified]     Current Outpatient Medications   Medication Sig    traMADoL (ULTRAM) 50 mg tablet     Eliquis 5 mg tablet     loperamide (IMODIUM) 2 mg capsule Take 1 Capsule by mouth four (4) times daily as needed for Diarrhea.  furosemide (LASIX) 20 mg tablet Take 2 tablets by mouth once daily    Comp Stocking,Knee,Regular,Med misc Use daily for lower leg swelling. No current facility-administered medications for this visit. There are no problems to display for this patient. No past medical history on file.     No Known Allergies    Past Surgical History:   Procedure Laterality Date    HX HEENT         Social History     Socioeconomic History    Marital status: SINGLE   Tobacco Use    Smoking status: Never Smoker    Smokeless tobacco: Never Used   Substance and Sexual Activity    Alcohol use: Never         Objective:     Review of Systems:  Constitutional: Negative for fatigue or malaise  HEENT: Negative for acute hearing or vision changes  Cardiovascular:see HPI,  Negative for dizziness, chest pain or palpitations  Respiratory: Negative for cough, wheezing or SOB  Gastrointestinal: see HPI  Genital/urinary: Negative for dysuria or voiding dysfunction  Musculoskeletal: Negative for myalgias or arthralgias   Neurological: Negative for headache, weakness or paresthesia  Skin: Negative for rash or lesion  Psychological: Negative for depression or anxiety           Vitals:    02/01/22 1406   BP: 108/66   Pulse: (!) 108   Resp: 16   Temp: 98.3 °F (36.8 °C)   SpO2: 97%   Weight: 149 lb 3.2 oz (67.7 kg)      Body mass index is 23.37 kg/m². Physical Exam:  Constitutional: well developed, well nourished, in no acute distress  Skin: normal tone and turgor  Head: normocephalic, atraumatic  Eyes: sclera clear, EOMI  Neck: normal range of motion  Cardiovascular:  regular rate and rhythm  Respiratory: Clear with symmetrical effort  Abdomen: reducible RLQ bulge, BS normal  Extremities: FROM, antalgic gait  Neurology: normal strength and sensation  Psych: active, alert and oriented, affect appropriate       Assessment and orders:       ICD-10-CM ICD-9-CM    1. Post-COVID-19 condition  U09.9 139.8    2. Acute pulmonary embolism, unspecified pulmonary embolism type, unspecified whether acute cor pulmonale present ([facility])  I26.99 415.19    3. Acute deep vein thrombosis (DVT) of proximal vein of right lower extremity ([facility])  I82.4Y1 453.41    4. Diarrhea, unspecified type  R19.7 787.91 loperamide (IMODIUM) 2 mg capsule   5. Hospital discharge follow-up  Z09 V67.59 MI DISCHARGE MEDS RECONCILED W/ CURRENT OUTPATIENT MED LIST         Plan of care:  Diagnoses were discussed in detail with patient. Medications reviewed and appropriate. Patient to continue current prescribed medications as written. Medication risks/benefits/side effects discussed with patient. All of the patient's questions were addressed and answered to apparent satisfaction. The patient understands and agrees with our plan of care. The patient knows to call back if they have questions about the plan of care or if symptoms change.   The patient received an After-Visit Summary which contains VS, diagnoses, orders, allergy and medication lists. Patient Care Team:  Addy Chaney MD as PCP - General (Family Medicine)  Addy Chaney MD as PCP - [address] [place], [place] [address]karen Escobar Provider    Follow-up and Dispositions    · Return in about 2 months (around 4/15/2022).          Future Appointments   Date Time Provider Eveline Bashir   4/15/2022  3:00 PM Addy Chaney MD [facility] BS AMB       Signed By: Zulay Mason MD     February 5, 2022

## 2022-02-18 DIAGNOSIS — R60.0 PEDAL EDEMA: ICD-10-CM

## 2022-02-18 RX ORDER — APIXABAN 5 MG/1
5 TABLET, FILM COATED ORAL EVERY 12 HOURS
Qty: 180 TABLET | Refills: 0 | Status: SHIPPED | OUTPATIENT
Start: 2022-02-18 | End: 2022-07-15

## 2022-02-18 RX ORDER — FUROSEMIDE 20 MG/1
40 TABLET ORAL DAILY
Qty: 180 TABLET | Refills: 0 | Status: SHIPPED | OUTPATIENT
Start: 2022-02-18 | End: 2022-07-15 | Stop reason: SDUPTHER

## 2022-02-18 RX ORDER — TRAMADOL HYDROCHLORIDE 50 MG/1
TABLET ORAL
OUTPATIENT
Start: 2022-02-18

## 2022-04-15 ENCOUNTER — OFFICE VISIT (OUTPATIENT)
Dept: FAMILY MEDICINE CLINIC | Age: 67
End: 2022-04-15
Payer: COMMERCIAL

## 2022-04-15 VITALS
HEART RATE: 103 BPM | RESPIRATION RATE: 16 BRPM | BODY MASS INDEX: 24.39 KG/M2 | SYSTOLIC BLOOD PRESSURE: 89 MMHG | DIASTOLIC BLOOD PRESSURE: 57 MMHG | TEMPERATURE: 99 F | OXYGEN SATURATION: 98 % | WEIGHT: 155.4 LBS | HEIGHT: 67 IN

## 2022-04-15 DIAGNOSIS — Z86.711 HISTORY OF PULMONARY EMBOLISM: ICD-10-CM

## 2022-04-15 DIAGNOSIS — R19.7 DIARRHEA, UNSPECIFIED TYPE: Primary | ICD-10-CM

## 2022-04-15 DIAGNOSIS — Z86.718 HISTORY OF DVT (DEEP VEIN THROMBOSIS): ICD-10-CM

## 2022-04-15 DIAGNOSIS — K45.8 OTHER SPECIFIED ABDOMINAL HERNIA WITHOUT OBSTRUCTION OR GANGRENE: ICD-10-CM

## 2022-04-15 DIAGNOSIS — Z86.16 HISTORY OF COVID-19: ICD-10-CM

## 2022-04-15 PROCEDURE — 99214 OFFICE O/P EST MOD 30 MIN: CPT | Performed by: FAMILY MEDICINE

## 2022-04-15 RX ORDER — BISMUTH SUBSALICYLATE 262 MG
1 TABLET,CHEWABLE ORAL DAILY
COMMUNITY

## 2022-04-15 NOTE — PATIENT INSTRUCTIONS
Stop eliquis 3 days prior to colonoscopy, then restart. Schedule follow up with surgeon to schedule hernia repair.

## 2022-04-15 NOTE — PROGRESS NOTES
1. Have you been to the ER, urgent care clinic since your last visit? Hospitalized since your last visit? No    2. Have you seen or consulted any other health care providers outside of the [address] [place] [address] since your last visit? Include any pap smears or colon screening. NO    3. For patients aged 39-70: Has the patient had a colonoscopy / FIT/ Cologuard? Stool sample test 12/2021 [facility], colonoscopy schedule for 4/22/22 with dr Lisa Dickinson   If the patient is female:    4. For patients aged 41-77: Has the patient had a mammogram within the past 2 years? NA - based on age or sex      11. For patients aged 21-65: Has the patient had a pap smear? NA - based on age or sex     Reviewed record in preparation for visit and have necessary documentation  Pt did not bring medication to office visit for review  Patient is accompanied by self I have received verbal consent from Sanford Turner to discuss any/all medical information while they are present in the room.     Goals that were addressed and/or need to be completed during or after this appointment include     Health Maintenance Due   Topic Date Due    Hepatitis C Screening  Never done    DTaP/Tdap/Td series (1 - Tdap) Never done    Colorectal Cancer Screening Combo  Never done    Shingrix Vaccine Age 50> (1 of 2) Never done    COVID-19 Vaccine (3 - Booster for Thomas Gallus series) 10/19/2021

## 2022-04-19 NOTE — PROGRESS NOTES
Patient: Yarely Soriano MRN: [id number]  SSN: [id number]    YOB: 1955  Age: 61 y.o. Sex: male        Subjective:     Chief Complaint   Patient presents with    Follow-up     2 month, complaining of swelling in lower legs        HPI: he is a 61y.o. year old male who presents for follow up. Patient anticoagulated for PE and DVT which he developed secondary to Covid-19. It has now been on anticoagulation for 3 months post provoked DVT. He has a hx of RLE edema. This has been well controlled with diuretic and compression stocking. He reports continued diarrhea symptoms and says he has a colonoscopy scheduled. Patient also with RLQ reducible bulge. Advised that he contact surgeon to schedule. He works at [employer]. He has been out of work for these problems. I advised patient to continue anticoagulation as a precaution, even though he is past 3 months of treatment. He is to discuss this with GI and surgeon. Encounter Diagnoses   Name Primary?  Diarrhea, unspecified type Yes    Other specified abdominal hernia without obstruction or gangrene     History of COVID-19     History of DVT (deep vein thrombosis)     History of pulmonary embolism        BP Readings from Last 3 Encounters:   04/15/22 (!) 89/57   02/01/22 108/66   12/01/21 117/74       Wt Readings from Last 3 Encounters:   04/15/22 155 lb 6.4 oz (70.5 kg)   02/01/22 149 lb 3.2 oz (67.7 kg)   12/01/21 166 lb 12.8 oz (75.7 kg)     Body mass index is 24.34 kg/m².     Lab Results   Component Value Date/Time    Cholesterol, total 139 06/02/2020 11:17 AM    HDL Cholesterol 38 06/02/2020 11:17 AM    LDL, calculated 75.4 06/02/2020 11:17 AM    Triglyceride 128 06/02/2020 11:17 AM    CHOL/HDL Ratio 3.7 06/02/2020 11:17 AM     Lab Results   Component Value Date/Time    TSH 1.96 12/01/2021 02:15 PM      Lab Results   Component Value Date/Time    Sodium 138 12/01/2021 02:15 PM    Potassium 4.5 12/01/2021 02:15 PM    Chloride 104 12/01/2021 02:15 PM    CO2 27 12/01/2021 02:15 PM    Anion gap 7 12/01/2021 02:15 PM    Glucose 87 12/01/2021 02:15 PM    BUN 9 12/01/2021 02:15 PM    Creatinine 0.90 12/01/2021 02:15 PM    BUN/Creatinine ratio 10 (L) 12/01/2021 02:15 PM    GFR est AA >60 12/01/2021 02:15 PM    GFR est non-AA >60 12/01/2021 02:15 PM    Calcium 9.0 12/01/2021 02:15 PM    Bilirubin, total 0.4 12/01/2021 02:15 PM    ALT (SGPT) 27 12/01/2021 02:15 PM    Alk. phosphatase 88 12/01/2021 02:15 PM    Protein, total 7.5 12/01/2021 02:15 PM    Albumin 3.4 (L) 12/01/2021 02:15 PM    Globulin 4.1 (H) 12/01/2021 02:15 PM    A-G Ratio 0.8 (L) 12/01/2021 02:15 PM          Current and past medical information:    Current Medications after this visit[de-identified]     Current Outpatient Medications   Medication Sig    multivitamin (ONE A DAY) tablet Take 1 Tablet by mouth daily.  Eliquis 5 mg tablet Take 1 Tablet by mouth every twelve (12) hours.  furosemide (LASIX) 20 mg tablet Take 2 Tablets by mouth daily.  loperamide (IMODIUM) 2 mg capsule Take 1 Capsule by mouth four (4) times daily as needed for Diarrhea.  Comp Stocking,Knee,Regular,Med misc Use daily for lower leg swelling.  traMADoL (ULTRAM) 50 mg tablet As needed per patient     No current facility-administered medications for this visit. There are no problems to display for this patient. History reviewed. No pertinent past medical history.     No Known Allergies    Past Surgical History:   Procedure Laterality Date    HX HEENT         Social History     Socioeconomic History    Marital status: SINGLE   Tobacco Use    Smoking status: Never Smoker    Smokeless tobacco: Never Used   Substance and Sexual Activity    Alcohol use: Never         Objective:     Review of Systems:  Constitutional: Negative for fatigue or malaise  HEENT: Negative for acute hearing or vision changes  Cardiovascular:see HPI,  Negative for dizziness, chest pain or palpitations  Respiratory: Negative for cough, wheezing or SOB  Gastrointestinal: see HPI  Genital/urinary: Negative for dysuria or voiding dysfunction  Musculoskeletal: Negative for myalgias or arthralgias   Neurological: Negative for headache, weakness or paresthesia  Skin: Negative for rash or lesion  Psychological: Negative for depression or anxiety           Vitals:    04/15/22 1512   BP: (!) 89/57   Pulse: (!) 103   Resp: 16   Temp: 99 °F (37.2 °C)   TempSrc: Oral   SpO2: 98%   Weight: 155 lb 6.4 oz (70.5 kg)   Height: 5' 7\" (1.702 m)      Body mass index is 24.34 kg/m². Physical Exam:  Constitutional: well developed, well nourished, in no acute distress  Skin: normal tone and turgor  Head: normocephalic, atraumatic  Eyes: sclera clear, EOMI  Neck: normal range of motion  Cardiovascular:  regular rate and rhythm  Respiratory: Clear with symmetrical effort  Abdomen: reducible RLQ bulge, BS normal  Extremities: FROM, antalgic gait  Neurology: normal strength and sensation  Psych: active, alert and oriented, affect appropriate       Assessment and orders:       ICD-10-CM ICD-9-CM    1. Diarrhea, unspecified type  R19.7 787.91    2. Other specified abdominal hernia without obstruction or gangrene  K45.8 553.8    3. History of COVID-19  Z86.16 V12.09    4. History of DVT (deep vein thrombosis)  Z86.718 V12.51    5. History of pulmonary embolism  Z86.711 V12.55          Plan of care:  Diagnoses were discussed in detail with patient. Medications reviewed and appropriate. Patient to continue current prescribed medications as written. Medication risks/benefits/side effects discussed with patient. All of the patient's questions were addressed and answered to apparent satisfaction. The patient understands and agrees with our plan of care. The patient knows to call back if they have questions about the plan of care or if symptoms change. The patient received an After-Visit Summary which contains VS, diagnoses, orders, allergy and medication lists. Patient Care Team:  Gay Vasquez MD as PCP - General (Family Medicine)  Gay Vasquez MD as PCP - [facility] Empaneled Provider    Follow-up and Dispositions    · Return in about 3 months (around 7/15/2022), or if symptoms worsen or fail to improve.          Future Appointments   Date Time Provider Eveline Krysten   7/15/2022  1:40 PM Gay Vasquez MD [facility] BS AMB       Signed By: Leandro López MD     April 19, 2022

## 2022-07-15 ENCOUNTER — OFFICE VISIT (OUTPATIENT)
Dept: FAMILY MEDICINE CLINIC | Age: 67
End: 2022-07-15
Payer: COMMERCIAL

## 2022-07-15 VITALS
HEART RATE: 71 BPM | HEIGHT: 67 IN | WEIGHT: 140.2 LBS | BODY MASS INDEX: 22 KG/M2 | TEMPERATURE: 98.4 F | OXYGEN SATURATION: 97 % | SYSTOLIC BLOOD PRESSURE: 103 MMHG | DIASTOLIC BLOOD PRESSURE: 62 MMHG | RESPIRATION RATE: 18 BRPM

## 2022-07-15 DIAGNOSIS — D50.0 ANEMIA DUE TO GASTROINTESTINAL BLOOD LOSS: Primary | ICD-10-CM

## 2022-07-15 DIAGNOSIS — K50.118 CROHN'S DISEASE OF COLON WITH OTHER COMPLICATION (HCC): ICD-10-CM

## 2022-07-15 DIAGNOSIS — R60.0 PEDAL EDEMA: ICD-10-CM

## 2022-07-15 LAB — HGB BLD-MCNC: 6.3 G/DL

## 2022-07-15 PROCEDURE — 1123F ACP DISCUSS/DSCN MKR DOCD: CPT | Performed by: FAMILY MEDICINE

## 2022-07-15 PROCEDURE — 36416 COLLJ CAPILLARY BLOOD SPEC: CPT | Performed by: FAMILY MEDICINE

## 2022-07-15 PROCEDURE — 99214 OFFICE O/P EST MOD 30 MIN: CPT | Performed by: FAMILY MEDICINE

## 2022-07-15 PROCEDURE — 85018 HEMOGLOBIN: CPT | Performed by: FAMILY MEDICINE

## 2022-07-15 RX ORDER — PREDNISONE 20 MG/1
TABLET ORAL
COMMUNITY
Start: 2022-07-11 | End: 2022-08-29

## 2022-07-15 RX ORDER — FUROSEMIDE 20 MG/1
20 TABLET ORAL DAILY
Qty: 90 TABLET | Refills: 0 | Status: SHIPPED | OUTPATIENT
Start: 2022-07-15

## 2022-07-15 NOTE — PROGRESS NOTES
1. Have you been to the ER, urgent care clinic since your last visit? Hospitalized since your last visit? Yes When: Luis 64 7/13/22 blood transfusion     2. Have you seen or consulted any other health care providers outside of the [address] [place] [address] since your last visit? Include any pap smears or colon screening. Yes When: dr Annie Mejia     3. For patients aged 39-70: Has the patient had a colonoscopy / FIT/ Cologuard? Yes 4/2022 dr Annie Mejia     If the patient is female:    4. For patients aged 41-77: Has the patient had a mammogram within the past 2 years? NA - based on age or sex      11. For patients aged 21-65: Has the patient had a pap smear? NA - based on age or sex     Reviewed record in preparation for visit and have necessary documentation  Pt did not bring medication to office visit for review  Patient is accompanied by self I have received verbal consent from Marvin Estevez to discuss any/all medical information while they are present in the room.     Goals that were addressed and/or need to be completed during or after this appointment include     Health Maintenance Due   Topic Date Due    Hepatitis C Screening  Never done    DTaP/Tdap/Td series (1 - Tdap) Never done    Colorectal Cancer Screening Combo  Never done    Shingrix Vaccine Age 50> (1 of 2) Never done    COVID-19 Vaccine (3 - Booster for Moderna series) 10/19/2021    Pneumococcal 65+ years (1 - PCV) Never done

## 2022-07-20 NOTE — PROGRESS NOTES
Patient: Destini Helm MRN: [id number]  SSN: [id number]    YOB: 1955  Age: 74 y.o. Sex: male        Subjective:     Chief Complaint   Patient presents with    Follow-up     3 month f/u - pt still having diarrhea everyday, sometimes has loss of taste or change of taste - covid in January, pt had blood transfusion at [facility] 7/13/22 low hemoglobin at appointment with dr Marissa Magana - pt is still feeling very weak today        HPI: he is a 74y.o. year old male who presents for follow up. Patient with recent colonoscopy with dx of Crohn's disease. He was sent to ER for anemia and has been transfused. He has a hx of RLE edema. This has been well controlled with diuretic and compression stocking. Patient also with RLQ reducible bulge. He has pending appointment with surgeon. Encounter Diagnoses   Name Primary?  Anemia due to gastrointestinal blood loss Yes    Crohn's disease of colon with other complication (HCC)     Pedal edema        BP Readings from Last 3 Encounters:   07/15/22 103/62   04/15/22 (!) 89/57   02/01/22 108/66       Wt Readings from Last 3 Encounters:   07/15/22 140 lb 3.2 oz (63.6 kg)   04/15/22 155 lb 6.4 oz (70.5 kg)   02/01/22 149 lb 3.2 oz (67.7 kg)     Body mass index is 21.96 kg/m².     Lab Results   Component Value Date/Time    Cholesterol, total 139 06/02/2020 11:17 AM    HDL Cholesterol 38 06/02/2020 11:17 AM    LDL, calculated 75.4 06/02/2020 11:17 AM    Triglyceride 128 06/02/2020 11:17 AM    CHOL/HDL Ratio 3.7 06/02/2020 11:17 AM     Lab Results   Component Value Date/Time    TSH 1.96 12/01/2021 02:15 PM      Lab Results   Component Value Date/Time    Sodium 138 12/01/2021 02:15 PM    Potassium 4.5 12/01/2021 02:15 PM    Chloride 104 12/01/2021 02:15 PM    CO2 27 12/01/2021 02:15 PM    Anion gap 7 12/01/2021 02:15 PM    Glucose 87 12/01/2021 02:15 PM    BUN 9 12/01/2021 02:15 PM    Creatinine 0.90 12/01/2021 02:15 PM    BUN/Creatinine ratio 10 (L) 12/01/2021 02:15 PM GFR est AA >60 12/01/2021 02:15 PM    GFR est non-AA >60 12/01/2021 02:15 PM    Calcium 9.0 12/01/2021 02:15 PM    Bilirubin, total 0.4 12/01/2021 02:15 PM    ALT (SGPT) 27 12/01/2021 02:15 PM    Alk. phosphatase 88 12/01/2021 02:15 PM    Protein, total 7.5 12/01/2021 02:15 PM    Albumin 3.4 (L) 12/01/2021 02:15 PM    Globulin 4.1 (H) 12/01/2021 02:15 PM    A-G Ratio 0.8 (L) 12/01/2021 02:15 PM          Current and past medical information:    Current Medications after this visit[de-identified]     Current Outpatient Medications   Medication Sig    predniSONE (DELTASONE) 20 mg tablet TAKE 2 TABLETS BY MOUTH ONCE DAILY FOR 14 DAYS 1 ONCE DAILY FOR 14 DAYS 1/2 (ONE-HALF) ONCE DAILY FOR 14 DAYS    iron fum/vit C/ascorbate sod (IRON PLUS VITAMIN C PO) Take 65 mg by mouth.  furosemide (LASIX) 20 mg tablet Take 1 Tablet by mouth daily.  multivitamin (ONE A DAY) tablet Take 1 Tablet by mouth daily.  traMADoL (ULTRAM) 50 mg tablet As needed per patient    loperamide (IMODIUM) 2 mg capsule Take 1 Capsule by mouth four (4) times daily as needed for Diarrhea.  Comp Stocking,Knee,Regular,Med misc Use daily for lower leg swelling. No current facility-administered medications for this visit. There are no problems to display for this patient. History reviewed. No pertinent past medical history.     No Known Allergies    Past Surgical History:   Procedure Laterality Date    HX HEENT         Social History     Socioeconomic History    Marital status: SINGLE   Tobacco Use    Smoking status: Never Smoker    Smokeless tobacco: Never Used   Substance and Sexual Activity    Alcohol use: Never     Social Determinants of Health     Financial Resource Strain: Low Risk     Difficulty of Paying Living Expenses: Not hard at all   Food Insecurity: No Food Insecurity    Worried About Running Out of Food in the Last Year: Never true    Kelley of Food in the Last Year: Never true         Objective:     Review of Systems:  Constitutional: Negative for fatigue or malaise  HEENT: Negative for acute hearing or vision changes  Cardiovascular:see HPI,  Negative for dizziness, chest pain or palpitations  Respiratory: Negative for cough, wheezing or SOB  Gastrointestinal: see HPI  Genital/urinary: Negative for dysuria or voiding dysfunction  Musculoskeletal: Negative for myalgias or arthralgias   Neurological: Negative for headache, weakness or paresthesia  Skin: Negative for rash or lesion  Psychological: Negative for depression or anxiety           Vitals:    07/15/22 1344   BP: 103/62   Pulse: 71   Resp: 18   Temp: 98.4 °F (36.9 °C)   TempSrc: Oral   SpO2: 97%   Weight: 140 lb 3.2 oz (63.6 kg)   Height: 5' 7\" (1.702 m)      Body mass index is 21.96 kg/m². Physical Exam:  Constitutional: well developed, well nourished, in no acute distress  Skin: normal tone and turgor  Head: normocephalic, atraumatic  Eyes: sclera clear, EOMI  Neck: normal range of motion  Cardiovascular:  regular rate and rhythm  Respiratory: Clear with symmetrical effort  Extremities: FROM, antalgic gait  Neurology: normal strength and sensation  Psych: active, alert and oriented, affect appropriate       Assessment and orders:       ICD-10-CM ICD-9-CM    1. Anemia due to gastrointestinal blood loss  D50.0 280.0 AMB POC HEMOGLOBIN (HGB)      COLLECTION CAPILLARY BLOOD SPECIMEN   2. [facility]'s disease of colon with other [facility] ([facility]  I06.974 555.1    3. Pedal edema  R60.0 782.3 furosemide (LASIX) 20 mg tablet         Plan of care:  Diagnoses were discussed in detail with patient. Medications reviewed and appropriate. Patient to continue current prescribed medications as written. Medication risks/benefits/side effects discussed with patient. All of the patient's questions were addressed and answered to apparent satisfaction. The patient understands and agrees with our plan of care.   The patient knows to call back if they have questions about the plan of care or if symptoms change. The patient received an After-Visit Summary which contains VS, diagnoses, orders, allergy and medication lists. Patient Care Team:  Janet Beyer MD as PCP - General (Family Medicine)  Janet Beyer MD as PCP - [facility] Empaneled Provider    Follow-up and Dispositions    · Return in about 4 weeks (around 8/12/2022).          Future Appointments   Date Time Provider Eveline Krysten   8/15/2022  1:20 PM Janet Beyer MD [facility] BS AMB       Signed By: Macarena Espino MD     July 19, 2022

## 2022-08-15 ENCOUNTER — OFFICE VISIT (OUTPATIENT)
Dept: FAMILY MEDICINE CLINIC | Age: 67
End: 2022-08-15
Payer: COMMERCIAL

## 2022-08-15 VITALS
WEIGHT: 143.8 LBS | OXYGEN SATURATION: 96 % | SYSTOLIC BLOOD PRESSURE: 93 MMHG | RESPIRATION RATE: 18 BRPM | HEIGHT: 67 IN | DIASTOLIC BLOOD PRESSURE: 60 MMHG | HEART RATE: 96 BPM | BODY MASS INDEX: 22.57 KG/M2 | TEMPERATURE: 98.9 F

## 2022-08-15 DIAGNOSIS — K50.118 CROHN'S DISEASE OF COLON WITH OTHER COMPLICATION (HCC): ICD-10-CM

## 2022-08-15 DIAGNOSIS — D50.0 ANEMIA DUE TO GASTROINTESTINAL BLOOD LOSS: Primary | ICD-10-CM

## 2022-08-15 DIAGNOSIS — K45.8 OTHER SPECIFIED ABDOMINAL HERNIA WITHOUT OBSTRUCTION OR GANGRENE: ICD-10-CM

## 2022-08-15 DIAGNOSIS — R60.0 PEDAL EDEMA: ICD-10-CM

## 2022-08-15 LAB — HGB BLD-MCNC: 8.6 G/DL

## 2022-08-15 PROCEDURE — 99214 OFFICE O/P EST MOD 30 MIN: CPT | Performed by: FAMILY MEDICINE

## 2022-08-15 PROCEDURE — 85018 HEMOGLOBIN: CPT | Performed by: FAMILY MEDICINE

## 2022-08-15 PROCEDURE — 1123F ACP DISCUSS/DSCN MKR DOCD: CPT | Performed by: FAMILY MEDICINE

## 2022-08-15 RX ORDER — GUAIFENESIN 1200 MG
650 TABLET, EXTENDED RELEASE 12 HR ORAL
COMMUNITY
Start: 2022-01-25

## 2022-08-15 NOTE — PROGRESS NOTES
1. Have you been to the ER, urgent care clinic since your last visit? Hospitalized since your last visit? Yes When: [facility] blood transfusion 2 weeks ago     2. Have you seen or consulted any other health care providers outside of the [address] [place] [address] since your last visit? Include any pap smears or colon screening. Yes When: [facility],     3. For patients aged 39-70: Has the patient had a colonoscopy / FIT/ Cologuard? Yes dr Nury Crocker 2022     If the patient is female:    4. For patients aged 41-77: Has the patient had a mammogram within the past 2 years? NA - based on age or sex      11. For patients aged 21-65: Has the patient had a pap smear? NA - based on age or sex     Reviewed record in preparation for visit and have necessary documentation  Pt did not bring medication to office visit for review  Patient is accompanied by self I have received verbal consent from Molly Downing to discuss any/all medical information while they are present in the room.     Goals that were addressed and/or need to be completed during or after this appointment include     Health Maintenance Due   Topic Date Due    Hepatitis C Screening  Never done    DTaP/Tdap/Td series (1 - Tdap) Never done    Shingrix Vaccine Age 50> (1 of 2) Never done    COVID-19 Vaccine (3 - Booster for Moderna series) 10/19/2021    Pneumococcal 65+ years (1 - PCV) Never done

## 2022-08-22 NOTE — PROGRESS NOTES
Patient: Lux Mooney MRN: [id number]  SSN: [id number]    YOB: 1955  Age: 74 y.o. Sex: male        Subjective:     Chief Complaint   Patient presents with    Follow-up       HPI: he is a 74y.o. year old male who presents for follow up. Patient with history of anemia requiring transfusions and dx of Crohn's disease. He has a hx of RLE edema. This has been well controlled with diuretic and compression stocking. Patient also with RLQ reducible bulge. He has had appointment with surgeon with plan for surgical repair. Encounter Diagnoses   Name Primary? Anemia due to gastrointestinal blood loss Yes    Crohn's disease of colon with other complication [facility])     Other specified abdominal hernia without obstruction or gangrene     Pedal edema        BP Readings from Last 3 Encounters:   08/15/22 93/60   07/15/22 103/62   04/15/22 (!) 89/57       Wt Readings from Last 3 Encounters:   08/15/22 143 lb 12.8 oz (65.2 kg)   07/15/22 140 lb 3.2 oz (63.6 kg)   04/15/22 155 lb 6.4 oz (70.5 kg)     Body mass index is 22.52 kg/m². Current and past medical information:    Current Medications after this visit[de-identified]     Current Outpatient Medications   Medication Sig    acetaminophen 325 mg cap Take 650 mg by mouth.    predniSONE (DELTASONE) 20 mg tablet TAKE 2 TABLETS BY MOUTH ONCE DAILY FOR 14 DAYS 1 ONCE DAILY FOR 14 DAYS 1/2 (ONE-HALF) ONCE DAILY FOR 14 DAYS    iron fum/vit C/ascorbate sod (IRON PLUS VITAMIN C PO) Take 65 mg by mouth. furosemide (LASIX) 20 mg tablet Take 1 Tablet by mouth daily. multivitamin (ONE A DAY) tablet Take 1 Tablet by mouth daily. loperamide (IMODIUM) 2 mg capsule Take 1 Capsule by mouth four (4) times daily as needed for Diarrhea. Comp Stocking,Knee,Regular,Med misc Use daily for lower leg swelling. traMADoL (ULTRAM) 50 mg tablet As needed per patient (Patient not taking: Reported on 8/15/2022)     No current facility-administered medications for this visit. There are no problems to display for this patient. History reviewed. No pertinent past medical history. No Known Allergies    Past Surgical History:   Procedure Laterality Date    HX HEENT         Social History     Socioeconomic History    Marital status: SINGLE   Tobacco Use    Smoking status: Never    Smokeless tobacco: Never   Substance and Sexual Activity    Alcohol use: Never     Social Determinants of Health     Financial Resource Strain: Low Risk     Difficulty of Paying Living Expenses: Not hard at all   Food Insecurity: No Food Insecurity    Worried About Running Out of Food in the Last Year: Never true    Ran Out of Food in the Last Year: Never true         Objective:     Review of Systems:  Constitutional: Negative for fatigue or malaise  HEENT: Negative for acute hearing or vision changes  Cardiovascular: see HPI, Negative for dizziness, chest pain or palpitations  Respiratory: Negative for cough, wheezing or SOB  Gastrointestinal: see HPI  Genital/urinary: Negative for dysuria or voiding dysfunction  Musculoskeletal: Negative for myalgias or arthralgias   Neurological: Negative for headache, weakness or paresthesia  Skin: Negative for rash or lesion  Psychological: Negative for depression or anxiety           Vitals:    08/15/22 1314   BP: 93/60   Pulse: 96   Resp: 18   Temp: 98.9 °F (37.2 °C)   TempSrc: Oral   SpO2: 96%   Weight: 143 lb 12.8 oz (65.2 kg)   Height: 5' 7\" (1.702 m)      Body mass index is 22.52 kg/m².     Physical Exam:  Constitutional: well developed, well nourished, in no acute distress  Skin: normal tone and turgor  Head: normocephalic, atraumatic  Eyes: sclera clear, EOMI  Neck: normal range of motion  Cardiovascular:  regular rate and rhythm  Respiratory: Clear with symmetrical effort  Extremities: FROM, antalgic gait  Neurology: normal strength and sensation  Psych: active, alert and oriented, affect appropriate       Assessment and orders:       ICD-10-CM ICD-9-CM 1. Anemia due to gastrointestinal blood loss  D50.0 280.0 AMB POC HEMOGLOBIN (HGB)      COLLECTION CAPILLARY BLOOD SPECIMEN      2. Crohn's disease of colon with other complication ([facility]  G44.954 555.1       3. Other specified abdominal hernia without obstruction or gangrene  K45.8 553.8       4. Pedal edema  R60.0 782. 3             Plan of care:  Diagnoses were discussed in detail with patient. Medications reviewed and appropriate. Patient to continue current prescribed medications as written. Medication risks/benefits/side effects discussed with patient. All of the patient's questions were addressed and answered to apparent satisfaction. The patient understands and agrees with our plan of care. The patient knows to call back if they have questions about the plan of care or if symptoms change. The patient received an After-Visit Summary which contains VS, diagnoses, orders, allergy and medication lists. Patient Care Team:  Roman Moreira MD as PCP - General (Family Medicine)  Roman Moreira MD as PCP - [facility] Empaneled Provider    Follow-up and Dispositions    Return in about 2 weeks (around 8/29/2022) for anemia.          Future Appointments   Date Time Provider Evelnie Bashir   8/29/2022  1:20 PM [facility]       Signed By: Stacy Cr MD     August 21, 2022

## 2022-08-29 ENCOUNTER — OFFICE VISIT (OUTPATIENT)
Dept: FAMILY MEDICINE CLINIC | Age: 67
End: 2022-08-29
Payer: COMMERCIAL

## 2022-08-29 VITALS
RESPIRATION RATE: 18 BRPM | BODY MASS INDEX: 22.95 KG/M2 | HEIGHT: 67 IN | DIASTOLIC BLOOD PRESSURE: 68 MMHG | OXYGEN SATURATION: 97 % | SYSTOLIC BLOOD PRESSURE: 108 MMHG | TEMPERATURE: 98.2 F | WEIGHT: 146.2 LBS | HEART RATE: 97 BPM

## 2022-08-29 DIAGNOSIS — Z86.718 HISTORY OF DVT (DEEP VEIN THROMBOSIS): ICD-10-CM

## 2022-08-29 DIAGNOSIS — K50.118 CROHN'S DISEASE OF COLON WITH OTHER COMPLICATION (HCC): ICD-10-CM

## 2022-08-29 DIAGNOSIS — Z86.711 HISTORY OF PULMONARY EMBOLISM: ICD-10-CM

## 2022-08-29 DIAGNOSIS — K45.8 OTHER SPECIFIED ABDOMINAL HERNIA WITHOUT OBSTRUCTION OR GANGRENE: ICD-10-CM

## 2022-08-29 DIAGNOSIS — M79.662 PAIN IN LEFT LOWER LEG: Primary | ICD-10-CM

## 2022-08-29 PROCEDURE — 99214 OFFICE O/P EST MOD 30 MIN: CPT | Performed by: FAMILY MEDICINE

## 2022-08-29 PROCEDURE — 1123F ACP DISCUSS/DSCN MKR DOCD: CPT | Performed by: FAMILY MEDICINE

## 2022-08-29 NOTE — PROGRESS NOTES
1. Have you been to the ER, urgent care clinic since your last visit? Hospitalized since your last visit? No    2. Have you seen or consulted any other health care providers outside of the [address] [place] [address] since your last visit? Include any pap smears or colon screening. Yes When: [facility] labwork     3. For patients aged 39-70: Has the patient had a colonoscopy / FIT/ Cologuard? 4/2022    If the patient is female:    4. For patients aged 41-77: Has the patient had a mammogram within the past 2 years? NA - based on age or sex      11. For patients aged 21-65: Has the patient had a pap smear? NA - based on age or sex     Reviewed record in preparation for visit and have necessary documentation  Pt did not bring medication to office visit for review  Patient is accompanied by self I have received verbal consent from Qi Yang to discuss any/all medical information while they are present in the room.     Goals that were addressed and/or need to be completed during or after this appointment include     Health Maintenance Due   Topic Date Due    Hepatitis C Screening  Never done    DTaP/Tdap/Td series (1 - Tdap) Never done    Shingrix Vaccine Age 50> (1 of 2) Never done    COVID-19 Vaccine (3 - Booster for Moderna series) 10/19/2021    Pneumococcal 65+ years (1 - PCV) Never done

## 2022-08-29 NOTE — LETTER
To Dr Kirstin Hines office    Please fax us the most recent lab results so that we may update the patient's records for continuity of care.      Our fax number: [phone]    Patient:   Saint Hedge  1955

## 2022-08-30 ENCOUNTER — TELEPHONE (OUTPATIENT)
Dept: FAMILY MEDICINE CLINIC | Age: 67
End: 2022-08-30

## 2022-08-31 ENCOUNTER — TELEPHONE (OUTPATIENT)
Dept: FAMILY MEDICINE CLINIC | Age: 67
End: 2022-08-31

## 2022-08-31 RX ORDER — APIXABAN 5 MG/1
5 TABLET, FILM COATED ORAL EVERY 12 HOURS
Qty: 180 TABLET | Refills: 0 | Status: SHIPPED | OUTPATIENT
Start: 2022-08-31

## 2022-08-31 NOTE — PROGRESS NOTES
Patient: Ryland Rodriguez MRN: [id number]  SSN: [id number]    YOB: 1955  Age: 74 y.o. Sex: male        Subjective:     Chief Complaint   Patient presents with    Follow-up     Anemia, labs        HPI: he is a 74y.o. year old male who presents for follow up. Patient with history of anemia requiring transfusions and dx of Crohn's disease. He has a hx of DVT and PE. He is off of anticoagulants. LE edema edema been well controlled with diuretic and compression stocking. He has had appointment with surgeon with plan for surgical repair of hernia. He complains today of pain in left posterior proximal lower leg. Encounter Diagnoses   Name Primary? Pain in left lower leg Yes    History of DVT (deep vein thrombosis)     History of pulmonary embolism     Crohn's disease of colon with other complication (HCC)     Other specified abdominal hernia without obstruction or gangrene        BP Readings from Last 3 Encounters:   08/29/22 108/68   08/15/22 93/60   07/15/22 103/62       Wt Readings from Last 3 Encounters:   08/29/22 146 lb 3.2 oz (66.3 kg)   08/15/22 143 lb 12.8 oz (65.2 kg)   07/15/22 140 lb 3.2 oz (63.6 kg)     Body mass index is 22.9 kg/m². Current and past medical information:    Current Medications after this visit[de-identified]     Current Outpatient Medications   Medication Sig    acetaminophen 325 mg cap Take 650 mg by mouth.    iron fum/vit C/ascorbate sod (IRON PLUS VITAMIN C PO) Take 65 mg by mouth. furosemide (LASIX) 20 mg tablet Take 1 Tablet by mouth daily. multivitamin (ONE A DAY) tablet Take 1 Tablet by mouth daily. loperamide (IMODIUM) 2 mg capsule Take 1 Capsule by mouth four (4) times daily as needed for Diarrhea. Comp Stocking,Knee,Regular,Med misc Use daily for lower leg swelling. Eliquis 5 mg tablet Take 1 Tablet by mouth every twelve (12) hours. No current facility-administered medications for this visit.        There are no problems to display for this patient. History reviewed. No pertinent past medical history. No Known Allergies    Past Surgical History:   Procedure Laterality Date    HX HEENT         Social History     Socioeconomic History    Marital status: SINGLE   Tobacco Use    Smoking status: Never    Smokeless tobacco: Never   Substance and Sexual Activity    Alcohol use: Never     Social Determinants of Health     Financial Resource Strain: Low Risk     Difficulty of Paying Living Expenses: Not hard at all   Food Insecurity: No Food Insecurity    Worried About Running Out of Food in the Last Year: Never true    Ran Out of Food in the Last Year: Never true         Objective:     Review of Systems:  Constitutional: Negative for fatigue or malaise  HEENT: Negative for acute hearing or vision changes  Cardiovascular: see HPI, Negative for dizziness, chest pain or palpitations  Respiratory: Negative for cough, wheezing or SOB  Gastrointestinal: see HPI  Genital/urinary: Negative for dysuria or voiding dysfunction  Musculoskeletal: see HPI   Neurological: Negative for headache, weakness or paresthesia  Skin: Negative for rash or lesion  Psychological: Negative for depression or anxiety         Vitals:    08/29/22 1308   BP: 108/68   Pulse: 97   Resp: 18   Temp: 98.2 °F (36.8 °C)   TempSrc: Oral   SpO2: 97%   Weight: 146 lb 3.2 oz (66.3 kg)   Height: 5' 7\" (1.702 m)      Body mass index is 22.9 kg/m². Physical Exam:  Constitutional: well developed, well nourished, in no acute distress  Skin: normal tone and turgor  Head: normocephalic, atraumatic  Eyes: sclera clear, EOMI  Neck: normal range of motion  Cardiovascular:  regular rate and rhythm  Respiratory: Clear with symmetrical effort  Extremities: left posterior proximal lower leg TTP, antalgic gait  Neurology: normal strength and sensation  Psych: active, alert and oriented, affect appropriate       Assessment and orders:       ICD-10-CM ICD-9-CM    1.  Pain in left lower leg  M79.662 729.5 DUPLEX LOWER EXT VENOUS BILAT      2. History of DVT (deep vein thrombosis)  Z86.718 V12.51 DUPLEX LOWER EXT VENOUS BILAT      3. History of pulmonary embolism  Z86.711 V12.55       4. Crohn's disease of colon with other complication ([facility]  Y99.638 555.1       5. Other specified abdominal hernia without obstruction or gangrene  K45.8 553.8             Plan of care:  Diagnoses were discussed in detail with patient. Medications reviewed and appropriate. Patient to continue current prescribed medications as written. Medication risks/benefits/side effects discussed with patient. All of the patient's questions were addressed and answered to apparent satisfaction. The patient understands and agrees with our plan of care. The patient knows to call back if they have questions about the plan of care or if symptoms change. The patient received an After-Visit Summary which contains VS, diagnoses, orders, allergy and medication lists. Patient Care Team:  Chica Morales MD as PCP - General (Family Medicine)  Chica Morales MD as PCP - [address] [place] [address] [facility]    Follow-up and Dispositions    Return in about 6 weeks (around 10/10/2022).          Future Appointments   Date Time Provider Eveline Bashir   10/10/2022  1:00 PM Chica Morales MD [facility] BS AMB       Signed By: Asya Penn MD     August 31, 2022

## 2022-08-31 NOTE — TELEPHONE ENCOUNTER
Called [facility], spoke with Dorcas Jolly. She was advised Dr Deedee Phelps to send Rx to pharmacy. Dorcas Jolly stated she would inform patient.

## 2022-09-01 ENCOUNTER — TELEPHONE (OUTPATIENT)
Dept: FAMILY MEDICINE CLINIC | Age: 67
End: 2022-09-01

## 2022-09-01 DIAGNOSIS — D50.0 ANEMIA DUE TO GASTROINTESTINAL BLOOD LOSS: Primary | ICD-10-CM

## 2022-09-01 NOTE — TELEPHONE ENCOUNTER
Called [facility], spoke with Connor Null. She was advised to make Dr Elke Maurice aware per Dr Enriquez:\"Patient had a DVT identified yesterday (8-31-22) and order written to start on Eliquis 5 mg tablet 1 by mouth every 12 hours. Patient has history of PE and DVT. \" Verbalized understanding. She stated patient has follow up appt with Dr Elke Maurice 10-17-22.

## 2022-09-01 NOTE — TELEPHONE ENCOUNTER
Called patient. He was advised medicine Dr Mateusz Rojas prescribed yesterday for DVT is a blood thinner. Patient stated he hasn't started taking medicine because he didn't get a txt from [facility]. Patient advised to  medication and start taking. He was advised to monitor his bowel movements for any change such as black/dark/tarry or visible blood noted. He was advised if he notices any change to contact office. Patient advised he has follow up appt with Dr Delbert Dang 10-17-22 at 2 PM and Dr Mateusz Rojas 10-10-22 1 PM. Verbalized understanding.

## 2022-09-03 NOTE — TELEPHONE ENCOUNTER
Please ask patient to come in for CBC. We will need to monitor his blood count closely. Choice between anticoagulation v. prior GI blood loss is difficult. I should get patient in to discuss this face to face. Please call and schedule.

## 2022-09-06 NOTE — TELEPHONE ENCOUNTER
Attempted to call. No answer. Message left. Advise patient he needs to come in and have lab drawn (CBC) and schedule Office Visit with Dr Arturo Li.

## 2023-01-03 DIAGNOSIS — R60.0 PEDAL EDEMA: ICD-10-CM

## 2023-01-06 RX ORDER — FUROSEMIDE 20 MG/1
TABLET ORAL
Qty: 90 TABLET | Refills: 0 | Status: SHIPPED | OUTPATIENT
Start: 2023-01-06

## 2023-04-17 DIAGNOSIS — R60.0 PEDAL EDEMA: ICD-10-CM

## 2023-04-20 NOTE — TELEPHONE ENCOUNTER
Called patient, left [facility] for return call. Follw up Office Visit needs to be scheduled with Dr Leodan Thrasher. Letter sent.

## 2023-04-23 RX ORDER — FUROSEMIDE 20 MG/1
TABLET ORAL
Qty: 90 TABLET | Refills: 0 | Status: SHIPPED | OUTPATIENT
Start: 2023-04-23

## 2023-05-05 ENCOUNTER — OFFICE VISIT (OUTPATIENT)
Dept: FAMILY MEDICINE CLINIC | Age: 68
End: 2023-05-05
Payer: MEDICARE

## 2023-05-05 VITALS
SYSTOLIC BLOOD PRESSURE: 122 MMHG | DIASTOLIC BLOOD PRESSURE: 76 MMHG | WEIGHT: 163.4 LBS | OXYGEN SATURATION: 97 % | RESPIRATION RATE: 16 BRPM | HEIGHT: 67 IN | TEMPERATURE: 97.7 F | HEART RATE: 75 BPM | BODY MASS INDEX: 25.65 KG/M2

## 2023-05-05 DIAGNOSIS — Z86.711 HISTORY OF PULMONARY EMBOLISM: ICD-10-CM

## 2023-05-05 DIAGNOSIS — Z87.19 HISTORY OF GI BLEED: ICD-10-CM

## 2023-05-05 DIAGNOSIS — K50.118 CROHN'S DISEASE OF COLON WITH OTHER COMPLICATION (HCC): ICD-10-CM

## 2023-05-05 DIAGNOSIS — Z86.718 HISTORY OF DVT (DEEP VEIN THROMBOSIS): ICD-10-CM

## 2023-05-05 DIAGNOSIS — R60.0 PEDAL EDEMA: Primary | ICD-10-CM

## 2023-05-05 PROCEDURE — G8427 DOCREV CUR MEDS BY ELIG CLIN: HCPCS | Performed by: FAMILY MEDICINE

## 2023-05-05 PROCEDURE — G8536 NO DOC ELDER MAL SCRN: HCPCS | Performed by: FAMILY MEDICINE

## 2023-05-05 PROCEDURE — 1101F PT FALLS ASSESS-DOCD LE1/YR: CPT | Performed by: FAMILY MEDICINE

## 2023-05-05 PROCEDURE — G8510 SCR DEP NEG, NO PLAN REQD: HCPCS | Performed by: FAMILY MEDICINE

## 2023-05-05 PROCEDURE — 3017F COLORECTAL CA SCREEN DOC REV: CPT | Performed by: FAMILY MEDICINE

## 2023-05-05 PROCEDURE — 99214 OFFICE O/P EST MOD 30 MIN: CPT | Performed by: FAMILY MEDICINE

## 2023-05-05 PROCEDURE — 1123F ACP DISCUSS/DSCN MKR DOCD: CPT | Performed by: FAMILY MEDICINE

## 2023-05-05 PROCEDURE — G8417 CALC BMI ABV UP PARAM F/U: HCPCS | Performed by: FAMILY MEDICINE

## 2023-05-05 RX ORDER — IBUPROFEN AND PHENYLEPHRINE HYDROCHLORIDE 200; 10 MG/1; MG/1
1 TABLET, FILM COATED ORAL AS NEEDED
COMMUNITY

## 2023-05-25 RX ORDER — FUROSEMIDE 20 MG/1
1 TABLET ORAL DAILY
COMMUNITY
Start: 2023-04-23 | End: 2023-07-19

## 2024-01-30 ENCOUNTER — OFFICE VISIT (OUTPATIENT)
Facility: CLINIC | Age: 69
End: 2024-01-30
Payer: COMMERCIAL

## 2024-01-30 VITALS
DIASTOLIC BLOOD PRESSURE: 69 MMHG | HEIGHT: 67 IN | OXYGEN SATURATION: 97 % | TEMPERATURE: 98.4 F | WEIGHT: 157.8 LBS | HEART RATE: 89 BPM | SYSTOLIC BLOOD PRESSURE: 107 MMHG | RESPIRATION RATE: 14 BRPM | BODY MASS INDEX: 24.77 KG/M2

## 2024-01-30 DIAGNOSIS — K50.90 CROHN'S DISEASE WITHOUT COMPLICATION, UNSPECIFIED GASTROINTESTINAL TRACT LOCATION (HCC): ICD-10-CM

## 2024-01-30 DIAGNOSIS — R60.0 LOCALIZED EDEMA: Primary | ICD-10-CM

## 2024-01-30 PROCEDURE — 99214 OFFICE O/P EST MOD 30 MIN: CPT | Performed by: FAMILY MEDICINE

## 2024-01-30 PROCEDURE — 1123F ACP DISCUSS/DSCN MKR DOCD: CPT | Performed by: FAMILY MEDICINE

## 2024-01-30 RX ORDER — PNV NO.95/FERROUS FUM/FOLIC AC 28MG-0.8MG
65 TABLET ORAL DAILY
COMMUNITY

## 2024-01-30 RX ORDER — BALSALAZIDE DISODIUM 750 MG/1
1500 CAPSULE ORAL 3 TIMES DAILY
COMMUNITY
Start: 2023-08-11

## 2024-01-30 RX ORDER — ASCORBIC ACID 500 MG
500 TABLET ORAL DAILY
COMMUNITY
Start: 2023-07-03

## 2024-01-30 RX ORDER — ACETAMINOPHEN 160 MG
1 TABLET,DISINTEGRATING ORAL DAILY
COMMUNITY
Start: 2021-12-22

## 2024-01-30 SDOH — ECONOMIC STABILITY: INCOME INSECURITY: HOW HARD IS IT FOR YOU TO PAY FOR THE VERY BASICS LIKE FOOD, HOUSING, MEDICAL CARE, AND HEATING?: NOT HARD AT ALL

## 2024-01-30 SDOH — ECONOMIC STABILITY: HOUSING INSECURITY
IN THE LAST 12 MONTHS, WAS THERE A TIME WHEN YOU DID NOT HAVE A STEADY PLACE TO SLEEP OR SLEPT IN A SHELTER (INCLUDING NOW)?: NO

## 2024-01-30 SDOH — ECONOMIC STABILITY: FOOD INSECURITY: WITHIN THE PAST 12 MONTHS, YOU WORRIED THAT YOUR FOOD WOULD RUN OUT BEFORE YOU GOT MONEY TO BUY MORE.: NEVER TRUE

## 2024-01-30 SDOH — ECONOMIC STABILITY: FOOD INSECURITY: WITHIN THE PAST 12 MONTHS, THE FOOD YOU BOUGHT JUST DIDN'T LAST AND YOU DIDN'T HAVE MONEY TO GET MORE.: NEVER TRUE

## 2024-01-30 ASSESSMENT — PATIENT HEALTH QUESTIONNAIRE - PHQ9
2. FEELING DOWN, DEPRESSED OR HOPELESS: 0
SUM OF ALL RESPONSES TO PHQ QUESTIONS 1-9: 0
SUM OF ALL RESPONSES TO PHQ QUESTIONS 1-9: 0
1. LITTLE INTEREST OR PLEASURE IN DOING THINGS: 0
SUM OF ALL RESPONSES TO PHQ QUESTIONS 1-9: 0
SUM OF ALL RESPONSES TO PHQ QUESTIONS 1-9: 0
SUM OF ALL RESPONSES TO PHQ9 QUESTIONS 1 & 2: 0

## 2024-01-30 NOTE — PROGRESS NOTES
Chief Complaint   Patient presents with    Follow-up Chronic Condition         \"Have you been to the ER, urgent care clinic since your last visit?  Hospitalized since your last visit?\"    NO    “Have you seen or consulted any other health care providers outside of [facility] since your last visit?”    YES, Dr Mcduffie-GI           Health Maintenance Due   Topic Date Due    Hepatitis C screen  Never done    DTaP/Tdap/Td vaccine (1 - Tdap) Never done    Shingles vaccine (1 of 2) Never done    Respiratory Syncytial Virus (RSV) Pregnant or age 60 yrs+ (1 - 1-dose 60+ series) Never done    Pneumococcal 65+ years Vaccine (1 - PCV) Never done    Flu vaccine (1) 08/01/2023    COVID-19 Vaccine (3 - 2023-24 season) 09/01/2023

## 2024-01-31 PROBLEM — K50.90 CROHN'S DISEASE WITHOUT COMPLICATION (HCC): Status: ACTIVE | Noted: 2024-01-31

## 2024-01-31 PROBLEM — R60.0 LOCALIZED EDEMA: Status: ACTIVE | Noted: 2024-01-31

## 2024-01-31 LAB
ANION GAP SERPL CALC-SCNC: 5 MMOL/L (ref 5–15)
BUN SERPL-MCNC: 10 MG/DL (ref 6–20)
BUN/CREAT SERPL: 11 (ref 12–20)
CALCIUM SERPL-MCNC: 10.2 MG/DL (ref 8.5–10.1)
CHLORIDE SERPL-SCNC: 105 MMOL/L (ref 97–108)
CO2 SERPL-SCNC: 29 MMOL/L (ref 21–32)
CREAT SERPL-MCNC: 0.9 MG/DL (ref 0.7–1.3)
GLUCOSE SERPL-MCNC: 79 MG/DL (ref 65–100)
POTASSIUM SERPL-SCNC: 4.7 MMOL/L (ref 3.5–5.1)
SODIUM SERPL-SCNC: 139 MMOL/L (ref 136–145)

## 2024-01-31 RX ORDER — FUROSEMIDE 20 MG/1
TABLET ORAL
Qty: 90 TABLET | Refills: 1 | Status: SHIPPED | OUTPATIENT
Start: 2024-01-31

## 2024-02-01 NOTE — PROGRESS NOTES
Progress Note    Patient: Dave Mayo MRN: [id number]  SSN: [id number]    YOB: 1955  Age: 68 y.o.  Sex: male        Chief Complaint   Patient presents with    Follow-up Chronic Condition     he is a 68 y.o. year old male who presents for follow up of chronic health conditions. Patient with history of anemia requiring transfusions, Crohn's disease, DVT and PE. He has been off of anticoagulants. LE edema edema been well controlled with diuretic and  compression stocking. He has had surgical repair of hernia. Patient has had recent colonoscopy as follow up of his Crohn's disease.      Encounter Diagnoses   Name Primary?    Localized edema Yes    Crohn's disease without complication, unspecified gastrointestinal tract location (HCC)      BP Readings from Last 3 Encounters:   01/30/24 107/69   05/05/23 122/76   08/29/22 108/68     Wt Readings from Last 3 Encounters:   01/30/24 71.6 kg (157 lb 12.8 oz)   05/05/23 74.1 kg (163 lb 6.4 oz)   08/29/22 66.3 kg (146 lb 3.2 oz)     Body mass index is 24.71 kg/m².    BMP:    Lab Results   Component Value Date/Time     01/30/2024 04:30 PM    K 4.7 01/30/2024 04:30 PM     01/30/2024 04:30 PM    CO2 29 01/30/2024 04:30 PM    BUN 10 01/30/2024 04:30 PM    LABALBU 3.4 12/01/2021 02:15 PM    CREATININE 0.90 01/30/2024 04:30 PM    CALCIUM 10.2 01/30/2024 04:30 PM    GFRAA >60 12/01/2021 02:15 PM    LABGLOM >60 01/30/2024 04:30 PM    GLUCOSE 79 01/30/2024 04:30 PM         Patient Active Problem List   Diagnosis    Localized edema    Crohn's disease without complication (HCC)     Past Surgical History:   Procedure Laterality Date    HEENT       Social History     Socioeconomic History    Marital status: Single     Spouse name: Not on file    Number of children: Not on file    Years of education: Not on file    Highest education level: Not on file   Occupational History    Not on file   Tobacco Use    Smoking status: Never     Passive exposure: Never

## 2024-04-29 NOTE — TELEPHONE ENCOUNTER
Called patient, no answer left msg. Clarify with patient if he is using [facility] mail order pharmacy for Furosemide?

## 2024-05-01 RX ORDER — FUROSEMIDE 20 MG/1
20 TABLET ORAL DAILY
Qty: 90 TABLET | Refills: 1 | Status: SHIPPED | OUTPATIENT
Start: 2024-05-01

## 2024-07-23 ENCOUNTER — OFFICE VISIT (OUTPATIENT)
Facility: CLINIC | Age: 69
End: 2024-07-23
Payer: COMMERCIAL

## 2024-07-23 VITALS
SYSTOLIC BLOOD PRESSURE: 114 MMHG | WEIGHT: 170.4 LBS | HEART RATE: 85 BPM | OXYGEN SATURATION: 96 % | TEMPERATURE: 97.3 F | BODY MASS INDEX: 26.74 KG/M2 | DIASTOLIC BLOOD PRESSURE: 72 MMHG | HEIGHT: 67 IN | RESPIRATION RATE: 14 BRPM

## 2024-07-23 DIAGNOSIS — K50.90 CROHN'S DISEASE WITHOUT COMPLICATION, UNSPECIFIED GASTROINTESTINAL TRACT LOCATION (HCC): ICD-10-CM

## 2024-07-23 DIAGNOSIS — R60.0 LOCALIZED EDEMA: Primary | ICD-10-CM

## 2024-07-23 PROCEDURE — 1123F ACP DISCUSS/DSCN MKR DOCD: CPT | Performed by: FAMILY MEDICINE

## 2024-07-23 PROCEDURE — 99214 OFFICE O/P EST MOD 30 MIN: CPT | Performed by: FAMILY MEDICINE

## 2024-07-30 NOTE — PROGRESS NOTES
Chief Complaint   Patient presents with    6 Month Follow-Up      \"Have you been to the ER, urgent care clinic since your last visit?  Hospitalized since your last visit?\"    NO    “Have you seen or consulted any other health care providers outside of [facility] since your last visit?”    YES, Dr Mcduffie (in chart)            Click Here for Release of Records Request     Health Maintenance Due   Topic Date Due    Hepatitis C screen  Never done    DTaP/Tdap/Td vaccine (1 - Tdap) Never done    Shingles vaccine (1 of 2) Never done    Respiratory Syncytial Virus (RSV) Pregnant or age 60 yrs+ (1 - 1-dose 60+ series) Never done    Pneumococcal 65+ years Vaccine (1 of 1 - PCV) Never done    COVID-19 Vaccine (3 - 2023-24 season) 09/01/2023     
Not on file    Sexual activity: Not on file   Other Topics Concern    Not on file   Social History Narrative    Not on file     Social Determinants of Health     Financial Resource Strain: Low Risk  (1/30/2024)    Overall Financial Resource Strain (CARDIA)     Difficulty of Paying Living Expenses: Not hard at all   Food Insecurity: No Food Insecurity (1/30/2024)    Hunger Vital Sign     Worried About Running Out of Food in the Last Year: Never true     Ran Out of Food in the Last Year: Never true   Transportation Needs: Unknown (1/30/2024)    PRAPARE - Transportation     Lack of Transportation (Medical): Not on file     Lack of Transportation (Non-Medical): No   Physical Activity: Not on file   Stress: Not on file   Social Connections: Not on file   Intimate Partner Violence: Not on file   Housing Stability: Unknown (1/30/2024)    Housing Stability Vital Sign     Unable to Pay for Housing in the Last Year: Not on file     Number of Places Lived in the Last Year: Not on file     Unstable Housing in the Last Year: No     History reviewed. No pertinent family history.  Current Outpatient Medications   Medication Sig    furosemide (LASIX) 20 MG tablet Take 1 tablet by mouth daily    Coenzyme Q10 (CO Q-10 PO) Take 1 capsule by mouth daily    balsalazide (COLAZAL) 750 MG capsule Take 2 capsules by mouth 3 times daily    Multiple Vitamin (MULTIVITAMIN ADULT PO) Take 1 tablet by mouth daily    vitamin C (ASCORBIC ACID) 500 MG tablet Take 1 tablet by mouth daily    Cholecalciferol (VITAMIN D3) 50 MCG (2000 UT) CAPS Take 1 capsule by mouth daily    Ferrous Sulfate (IRON) 325 (65 Fe) MG TABS Take 65 mg by mouth daily    Acetaminophen 325 MG CAPS Take 650 mg by mouth     No current facility-administered medications for this visit.     No Known Allergies    Review of Systems:  Constitutional: Negative for fatigue, malaise  Resp: Negative for cough, wheezing or SOB  CV: Negative for chest pain, dizziness or palpitations  GI:

## 2024-10-24 RX ORDER — FUROSEMIDE 20 MG/1
20 TABLET ORAL DAILY
Qty: 90 TABLET | Refills: 1 | Status: SHIPPED | OUTPATIENT
Start: 2024-10-24

## 2025-01-24 ENCOUNTER — OFFICE VISIT (OUTPATIENT)
Facility: CLINIC | Age: 70
End: 2025-01-24
Payer: MEDICARE

## 2025-01-24 VITALS
HEIGHT: 67 IN | SYSTOLIC BLOOD PRESSURE: 116 MMHG | HEART RATE: 75 BPM | OXYGEN SATURATION: 97 % | WEIGHT: 161 LBS | BODY MASS INDEX: 25.27 KG/M2 | DIASTOLIC BLOOD PRESSURE: 71 MMHG | TEMPERATURE: 98.7 F | RESPIRATION RATE: 16 BRPM

## 2025-01-24 DIAGNOSIS — K50.90 CROHN'S DISEASE WITHOUT COMPLICATION, UNSPECIFIED GASTROINTESTINAL TRACT LOCATION (HCC): ICD-10-CM

## 2025-01-24 DIAGNOSIS — B35.1 ONYCHOMYCOSIS: ICD-10-CM

## 2025-01-24 DIAGNOSIS — R60.0 LOCALIZED EDEMA: Primary | ICD-10-CM

## 2025-01-24 PROCEDURE — 1159F MED LIST DOCD IN RCRD: CPT | Performed by: FAMILY MEDICINE

## 2025-01-24 PROCEDURE — G8427 DOCREV CUR MEDS BY ELIG CLIN: HCPCS | Performed by: FAMILY MEDICINE

## 2025-01-24 PROCEDURE — G8419 CALC BMI OUT NRM PARAM NOF/U: HCPCS | Performed by: FAMILY MEDICINE

## 2025-01-24 PROCEDURE — 1160F RVW MEDS BY RX/DR IN RCRD: CPT | Performed by: FAMILY MEDICINE

## 2025-01-24 PROCEDURE — 1123F ACP DISCUSS/DSCN MKR DOCD: CPT | Performed by: FAMILY MEDICINE

## 2025-01-24 PROCEDURE — 1126F AMNT PAIN NOTED NONE PRSNT: CPT | Performed by: FAMILY MEDICINE

## 2025-01-24 PROCEDURE — 99214 OFFICE O/P EST MOD 30 MIN: CPT | Performed by: FAMILY MEDICINE

## 2025-01-24 PROCEDURE — 1036F TOBACCO NON-USER: CPT | Performed by: FAMILY MEDICINE

## 2025-01-24 PROCEDURE — 3017F COLORECTAL CA SCREEN DOC REV: CPT | Performed by: FAMILY MEDICINE

## 2025-01-24 RX ORDER — FUROSEMIDE 20 MG/1
20 TABLET ORAL DAILY
Qty: 30 TABLET | Refills: 0 | Status: SHIPPED | OUTPATIENT
Start: 2025-01-24

## 2025-01-24 RX ORDER — KETOCONAZOLE 20 MG/G
CREAM TOPICAL
Qty: 60 G | Refills: 2 | Status: SHIPPED | OUTPATIENT
Start: 2025-01-24

## 2025-01-24 SDOH — ECONOMIC STABILITY: FOOD INSECURITY: WITHIN THE PAST 12 MONTHS, YOU WORRIED THAT YOUR FOOD WOULD RUN OUT BEFORE YOU GOT MONEY TO BUY MORE.: NEVER TRUE

## 2025-01-24 SDOH — ECONOMIC STABILITY: FOOD INSECURITY: WITHIN THE PAST 12 MONTHS, THE FOOD YOU BOUGHT JUST DIDN'T LAST AND YOU DIDN'T HAVE MONEY TO GET MORE.: NEVER TRUE

## 2025-01-24 ASSESSMENT — PATIENT HEALTH QUESTIONNAIRE - PHQ9
2. FEELING DOWN, DEPRESSED OR HOPELESS: NOT AT ALL
SUM OF ALL RESPONSES TO PHQ9 QUESTIONS 1 & 2: 0
SUM OF ALL RESPONSES TO PHQ QUESTIONS 1-9: 0
1. LITTLE INTEREST OR PLEASURE IN DOING THINGS: NOT AT ALL
SUM OF ALL RESPONSES TO PHQ QUESTIONS 1-9: 0

## 2025-01-25 LAB
ALBUMIN SERPL-MCNC: 3.4 G/DL (ref 3.5–5)
ALBUMIN/GLOB SERPL: 0.9 (ref 1.1–2.2)
ALP SERPL-CCNC: 69 U/L (ref 45–117)
ALT SERPL-CCNC: 30 U/L (ref 12–78)
ANION GAP SERPL CALC-SCNC: 4 MMOL/L (ref 2–12)
AST SERPL-CCNC: 27 U/L (ref 15–37)
BILIRUB SERPL-MCNC: 0.6 MG/DL (ref 0.2–1)
BUN SERPL-MCNC: 10 MG/DL (ref 6–20)
BUN/CREAT SERPL: 12 (ref 12–20)
CALCIUM SERPL-MCNC: 9 MG/DL (ref 8.5–10.1)
CHLORIDE SERPL-SCNC: 106 MMOL/L (ref 97–108)
CO2 SERPL-SCNC: 28 MMOL/L (ref 21–32)
CREAT SERPL-MCNC: 0.86 MG/DL (ref 0.7–1.3)
ERYTHROCYTE [DISTWIDTH] IN BLOOD BY AUTOMATED COUNT: 12.7 % (ref 11.5–14.5)
GLOBULIN SER CALC-MCNC: 3.8 G/DL (ref 2–4)
GLUCOSE SERPL-MCNC: 81 MG/DL (ref 65–100)
HCT VFR BLD AUTO: 42.9 % (ref 36.6–50.3)
HGB BLD-MCNC: 14 G/DL (ref 12.1–17)
MCH RBC QN AUTO: 30 PG (ref 26–34)
MCHC RBC AUTO-ENTMCNC: 32.6 G/DL (ref 30–36.5)
MCV RBC AUTO: 92.1 FL (ref 80–99)
NRBC # BLD: 0 K/UL (ref 0–0.01)
NRBC BLD-RTO: 0 PER 100 WBC
PLATELET # BLD AUTO: 323 K/UL (ref 150–400)
PMV BLD AUTO: 10.6 FL (ref 8.9–12.9)
POTASSIUM SERPL-SCNC: 4.4 MMOL/L (ref 3.5–5.1)
PROT SERPL-MCNC: 7.2 G/DL (ref 6.4–8.2)
RBC # BLD AUTO: 4.66 M/UL (ref 4.1–5.7)
SODIUM SERPL-SCNC: 138 MMOL/L (ref 136–145)
WBC # BLD AUTO: 7.8 K/UL (ref 4.1–11.1)

## 2025-01-30 NOTE — PROGRESS NOTES
Chief Complaint   Patient presents with    6 Month Follow-Up    Nail Problem        \"Have you been to the ER, urgent care clinic since your last visit?  Hospitalized since your last visit?\"    NO    “Have you seen or consulted any other health care providers outside of [facility] since your last visit?”    Dr Mcduffie             Click Here for Release of Records Request     Health Maintenance Due   Topic Date Due    Hepatitis C screen  Never done    DTaP/Tdap/Td vaccine (1 - Tdap) Never done    Shingles vaccine (1 of 2) Never done    Pneumococcal 65+ years Vaccine (1 of 1 - PCV) Never done    Flu vaccine (1) 08/01/2024    COVID-19 Vaccine (3 - 2023-24 season) 09/01/2024    Depression Screen  01/30/2025       
Time Provider Department Center   7/25/2025  1:20 PM Nish Lynch MD [facility] DEP           Follow-up and Dispositions    Return in about 6 months (around 7/24/2025), or if symptoms worsen or fail to improve.

## 2025-05-14 ENCOUNTER — TELEPHONE (OUTPATIENT)
Facility: CLINIC | Age: 70
End: 2025-05-14

## 2025-05-14 DIAGNOSIS — R60.0 LOCALIZED EDEMA: ICD-10-CM

## 2025-05-15 RX ORDER — FUROSEMIDE 20 MG/1
20 TABLET ORAL DAILY
Qty: 90 TABLET | Refills: 0 | Status: SHIPPED | OUTPATIENT
Start: 2025-05-15

## 2025-07-25 ENCOUNTER — OFFICE VISIT (OUTPATIENT)
Facility: CLINIC | Age: 70
End: 2025-07-25
Payer: MEDICARE

## 2025-07-25 VITALS
RESPIRATION RATE: 16 BRPM | HEIGHT: 67 IN | BODY MASS INDEX: 26.37 KG/M2 | TEMPERATURE: 98.4 F | SYSTOLIC BLOOD PRESSURE: 114 MMHG | HEART RATE: 79 BPM | DIASTOLIC BLOOD PRESSURE: 68 MMHG | OXYGEN SATURATION: 97 % | WEIGHT: 168 LBS

## 2025-07-25 DIAGNOSIS — Z00.00 WELCOME TO MEDICARE PREVENTIVE VISIT: ICD-10-CM

## 2025-07-25 DIAGNOSIS — K50.90 CROHN'S DISEASE WITHOUT COMPLICATION, UNSPECIFIED GASTROINTESTINAL TRACT LOCATION (HCC): ICD-10-CM

## 2025-07-25 DIAGNOSIS — R60.0 LOCALIZED EDEMA: Primary | ICD-10-CM

## 2025-07-25 PROCEDURE — 1123F ACP DISCUSS/DSCN MKR DOCD: CPT | Performed by: FAMILY MEDICINE

## 2025-07-25 PROCEDURE — 3017F COLORECTAL CA SCREEN DOC REV: CPT | Performed by: FAMILY MEDICINE

## 2025-07-25 PROCEDURE — G0438 PPPS, INITIAL VISIT: HCPCS | Performed by: FAMILY MEDICINE

## 2025-07-25 PROCEDURE — 1036F TOBACCO NON-USER: CPT | Performed by: FAMILY MEDICINE

## 2025-07-25 PROCEDURE — G8427 DOCREV CUR MEDS BY ELIG CLIN: HCPCS | Performed by: FAMILY MEDICINE

## 2025-07-25 PROCEDURE — G8419 CALC BMI OUT NRM PARAM NOF/U: HCPCS | Performed by: FAMILY MEDICINE

## 2025-07-25 PROCEDURE — 1159F MED LIST DOCD IN RCRD: CPT | Performed by: FAMILY MEDICINE

## 2025-07-25 PROCEDURE — 99214 OFFICE O/P EST MOD 30 MIN: CPT | Performed by: FAMILY MEDICINE

## 2025-07-25 PROCEDURE — 1126F AMNT PAIN NOTED NONE PRSNT: CPT | Performed by: FAMILY MEDICINE

## 2025-07-25 PROCEDURE — 1160F RVW MEDS BY RX/DR IN RCRD: CPT | Performed by: FAMILY MEDICINE

## 2025-07-25 RX ORDER — FUROSEMIDE 20 MG/1
20 TABLET ORAL DAILY
Qty: 90 TABLET | Refills: 1 | Status: SHIPPED | OUTPATIENT
Start: 2025-07-25

## 2025-07-25 ASSESSMENT — PATIENT HEALTH QUESTIONNAIRE - PHQ9
2. FEELING DOWN, DEPRESSED OR HOPELESS: NOT AT ALL
SUM OF ALL RESPONSES TO PHQ QUESTIONS 1-9: 0
SUM OF ALL RESPONSES TO PHQ QUESTIONS 1-9: 0
1. LITTLE INTEREST OR PLEASURE IN DOING THINGS: NOT AT ALL
SUM OF ALL RESPONSES TO PHQ QUESTIONS 1-9: 0
SUM OF ALL RESPONSES TO PHQ QUESTIONS 1-9: 0

## 2025-07-25 ASSESSMENT — LIFESTYLE VARIABLES
HOW OFTEN DO YOU HAVE A DRINK CONTAINING ALCOHOL: NEVER
HOW MANY STANDARD DRINKS CONTAINING ALCOHOL DO YOU HAVE ON A TYPICAL DAY: PATIENT DOES NOT DRINK

## 2025-07-25 NOTE — PROGRESS NOTES
No chief complaint on file.       \"Have you been to the ER, urgent care clinic since your last visit?  Hospitalized since your last visit?\"    NO    “Have you seen or consulted any other health care providers outside of [facility] since your last visit?”    Dr. Froy rodriguezg gastro             Click Here for Release of Records Request     Health Maintenance Due   Topic Date Due    Hepatitis C screen  Never done    DTaP/Tdap/Td vaccine (1 - Tdap) Never done    Shingles vaccine (1 of 2) Never done    Pneumococcal 50+ years Vaccine (1 of 1 - PCV) Never done    COVID-19 Vaccine (3 - 2024-25 season) 09/01/2024    Annual Wellness Visit (Medicare Advantage)  Never done    Lipids  06/02/2025

## 2025-07-25 NOTE — PATIENT INSTRUCTIONS
to make treatment decisions for you when you can't speak for yourself. This person is called a health care agent (health care proxy, health care surrogate). The form is also called a durable power of  for health care.  If you do not have an advance directive, decisions about your medical care may be made by a family member or doctor who doesn't know you or by a .  It may help to think of an advance directive as a gift to the people who care for you. If you have one, they won't have to make tough decisions by themselves.  For more information, including forms for your state, see the [employer] website (www.caringinfo.org/planning/advance-directives/).  Follow-up care is a key part of your treatment and safety. Be sure to make and go to all appointments, and call your doctor if you are having problems. It's also a good idea to know your test results and keep a list of the medicines you take.  What should you include in an advance directive?  Many states have a unique advance directive form. (It may ask you to address specific issues.) Or you might use a universal form that's approved by many states.  If your form doesn't tell you what to address, it may be hard to know what to include in your advance directive. Use the questions below to help you get started.  Who do you want to make decisions about your medical care if you are not able to?  What life-support measures do you want if you have a serious illness that gets worse over time or can't be cured?  What are you most afraid of that might happen? (Maybe you're afraid of having pain, losing your independence, or being kept alive by machines.)  Where would you prefer to die? (Your home? A hospital? A nursing home?)  Do you want to donate your organs when you die?  Do you want certain Religion practices performed before you die?  When should you call for help?  Be sure to contact your doctor if you have any questions.  Where can you learn more?  Go to

## 2025-07-25 NOTE — PROGRESS NOTES
Progress Note    Patient: Dave Mayo MRN: [id number]  SSN: [id number]    YOB: 1955  Age: 70 y.o.  Sex: male        Chief Complaint   Patient presents with    6 Month Follow-Up    Medicare AWV     he is a 70 y.o. year old male who presents for follow up of chronic health conditions. Patient with history of anemia requiring transfusions, DVT and PE. He is off of anticoagulants. Crohn's disease is managed by GI. LE edema edema been well controlled with diuretic and compression stocking. Patient feeling good sans other complaints or concerns at this time.       Encounter Diagnoses   Name Primary?    Localized edema Yes    Crohn's disease without complication, unspecified gastrointestinal tract location (HCC)     Welcome to Medicare preventive visit      BP Readings from Last 3 Encounters:   07/25/25 114/68   01/24/25 116/71   07/23/24 114/72     Wt Readings from Last 3 Encounters:   07/25/25 76.2 kg (168 lb)   01/24/25 73 kg (161 lb)   07/23/24 77.3 kg (170 lb 6.4 oz)     Body mass index is 26.31 kg/m².    BMP:    Lab Results   Component Value Date/Time     01/24/2025 02:20 PM    K 4.4 01/24/2025 02:20 PM     01/24/2025 02:20 PM    CO2 28 01/24/2025 02:20 PM    BUN 10 01/24/2025 02:20 PM    CREATININE 0.86 01/24/2025 02:20 PM    CALCIUM 9.0 01/24/2025 02:20 PM    GFRAA >60 12/01/2021 02:15 PM    LABGLOM >90 01/24/2025 02:20 PM    LABGLOM >60 01/30/2024 04:30 PM    GLUCOSE 81 01/24/2025 02:20 PM         Patient Active Problem List   Diagnosis    Localized edema    Crohn's disease without complication (HCC)     Past Surgical History:   Procedure Laterality Date    HEENT       Social History     Socioeconomic History    Marital status: Single     Spouse name: Not on file    Number of children: Not on file    Years of education: Not on file    Highest education level: Not on file   Occupational History    Not on file   Tobacco Use    Smoking status: Never     Passive exposure: Never